# Patient Record
Sex: FEMALE | Race: WHITE | NOT HISPANIC OR LATINO | URBAN - METROPOLITAN AREA
[De-identification: names, ages, dates, MRNs, and addresses within clinical notes are randomized per-mention and may not be internally consistent; named-entity substitution may affect disease eponyms.]

---

## 2022-10-11 ENCOUNTER — OFFICE VISIT (OUTPATIENT)
Dept: NEUROLOGY | Facility: CLINIC | Age: 27
End: 2022-10-11
Payer: COMMERCIAL

## 2022-10-11 DIAGNOSIS — G43.709 CHRONIC MIGRAINE WITHOUT AURA WITHOUT STATUS MIGRAINOSUS, NOT INTRACTABLE: Primary | ICD-10-CM

## 2022-10-11 PROCEDURE — 99202 PR OFFICE/OUTPT VISIT, NEW, LEVL II, 15-29 MIN: ICD-10-PCS | Mod: 95,,,

## 2022-10-11 PROCEDURE — 99202 OFFICE O/P NEW SF 15 MIN: CPT | Mod: 95,,,

## 2022-10-11 RX ORDER — FREMANEZUMAB-VFRM 225 MG/1.5ML
225 INJECTION SUBCUTANEOUS
Qty: 3 EACH | Refills: 3 | Status: SHIPPED | OUTPATIENT
Start: 2022-10-11 | End: 2022-12-13 | Stop reason: ALTCHOICE

## 2022-10-11 RX ORDER — SUMATRIPTAN SUCCINATE 100 MG/1
100 TABLET ORAL 2 TIMES DAILY PRN
Qty: 12 TABLET | Refills: 11 | Status: SHIPPED | OUTPATIENT
Start: 2022-10-11 | End: 2023-10-13 | Stop reason: SDUPTHER

## 2022-11-07 ENCOUNTER — OFFICE VISIT (OUTPATIENT)
Dept: OBSTETRICS AND GYNECOLOGY | Facility: CLINIC | Age: 27
End: 2022-11-07
Attending: OBSTETRICS & GYNECOLOGY
Payer: COMMERCIAL

## 2022-11-07 VITALS
DIASTOLIC BLOOD PRESSURE: 84 MMHG | BODY MASS INDEX: 29.59 KG/M2 | WEIGHT: 156.75 LBS | SYSTOLIC BLOOD PRESSURE: 116 MMHG | HEIGHT: 61 IN

## 2022-11-07 DIAGNOSIS — Z01.419 WELL WOMAN EXAM: Primary | ICD-10-CM

## 2022-11-07 DIAGNOSIS — Z83.49 FAMILY HISTORY OF THYROID DISEASE: ICD-10-CM

## 2022-11-07 PROCEDURE — 1159F PR MEDICATION LIST DOCUMENTED IN MEDICAL RECORD: ICD-10-PCS | Mod: CPTII,S$GLB,, | Performed by: OBSTETRICS & GYNECOLOGY

## 2022-11-07 PROCEDURE — 1160F PR REVIEW ALL MEDS BY PRESCRIBER/CLIN PHARMACIST DOCUMENTED: ICD-10-PCS | Mod: CPTII,S$GLB,, | Performed by: OBSTETRICS & GYNECOLOGY

## 2022-11-07 PROCEDURE — 99385 PREV VISIT NEW AGE 18-39: CPT | Mod: S$GLB,,, | Performed by: OBSTETRICS & GYNECOLOGY

## 2022-11-07 PROCEDURE — 3074F SYST BP LT 130 MM HG: CPT | Mod: CPTII,S$GLB,, | Performed by: OBSTETRICS & GYNECOLOGY

## 2022-11-07 PROCEDURE — 3079F PR MOST RECENT DIASTOLIC BLOOD PRESSURE 80-89 MM HG: ICD-10-PCS | Mod: CPTII,S$GLB,, | Performed by: OBSTETRICS & GYNECOLOGY

## 2022-11-07 PROCEDURE — 1160F RVW MEDS BY RX/DR IN RCRD: CPT | Mod: CPTII,S$GLB,, | Performed by: OBSTETRICS & GYNECOLOGY

## 2022-11-07 PROCEDURE — 3074F PR MOST RECENT SYSTOLIC BLOOD PRESSURE < 130 MM HG: ICD-10-PCS | Mod: CPTII,S$GLB,, | Performed by: OBSTETRICS & GYNECOLOGY

## 2022-11-07 PROCEDURE — 99999 PR PBB SHADOW E&M-EST. PATIENT-LVL III: CPT | Mod: PBBFAC,,, | Performed by: OBSTETRICS & GYNECOLOGY

## 2022-11-07 PROCEDURE — 3008F BODY MASS INDEX DOCD: CPT | Mod: CPTII,S$GLB,, | Performed by: OBSTETRICS & GYNECOLOGY

## 2022-11-07 PROCEDURE — 88175 CYTOPATH C/V AUTO FLUID REDO: CPT | Performed by: OBSTETRICS & GYNECOLOGY

## 2022-11-07 PROCEDURE — 99999 PR PBB SHADOW E&M-EST. PATIENT-LVL III: ICD-10-PCS | Mod: PBBFAC,,, | Performed by: OBSTETRICS & GYNECOLOGY

## 2022-11-07 PROCEDURE — 99385 PR PREVENTIVE VISIT,NEW,18-39: ICD-10-PCS | Mod: S$GLB,,, | Performed by: OBSTETRICS & GYNECOLOGY

## 2022-11-07 PROCEDURE — 1159F MED LIST DOCD IN RCRD: CPT | Mod: CPTII,S$GLB,, | Performed by: OBSTETRICS & GYNECOLOGY

## 2022-11-07 PROCEDURE — 3079F DIAST BP 80-89 MM HG: CPT | Mod: CPTII,S$GLB,, | Performed by: OBSTETRICS & GYNECOLOGY

## 2022-11-07 PROCEDURE — 3008F PR BODY MASS INDEX (BMI) DOCUMENTED: ICD-10-PCS | Mod: CPTII,S$GLB,, | Performed by: OBSTETRICS & GYNECOLOGY

## 2022-11-07 RX ORDER — CETIRIZINE HYDROCHLORIDE 10 MG/1
CAPSULE, LIQUID FILLED ORAL
COMMUNITY

## 2022-11-07 NOTE — PROGRESS NOTES
"CC: Well woman exam    Poornima Anaya is a 27 y.o. female  presents for well woman exam.  LMP: No LMP recorded (lmp unknown). Patient has had an implant..  No gyn issues, problems, or complaints.      2019 Liletta.  Amenorrheic and happy with it    2019 last pap  No hx abnormal pap  Has not had gardasil    . From [facility] PCP.  Requests baseline annual labs    Past Medical History:   Diagnosis Date    Migraines      Past Surgical History:   Procedure Laterality Date    TONSILLECTOMY       Social History     Socioeconomic History    Marital status:    Tobacco Use    Smoking status: Never    Smokeless tobacco: Never   Substance and Sexual Activity    Alcohol use: Yes    Drug use: Never    Sexual activity: Yes     Partners: Male     Birth control/protection: I.U.D.     Family History   Problem Relation Age of Onset    Breast cancer Maternal Grandmother     Thyroid cancer Maternal Grandmother      OB History          0    Para   0    Term   0       0    AB   0    Living   0         SAB   0    IAB   0    Ectopic   0    Multiple   0    Live Births   0                 /84   Ht 5' 1" (1.549 m)   Wt 71.1 kg (156 lb 12 oz)   LMP  (LMP Unknown) Comment: IUD  BMI 29.62 kg/m²       ROS:  GENERAL: Denies weight gain or weight loss. Feeling well overall.   SKIN: Denies rash or lesions.   HEAD: Denies head injury or headache.   NODES: Denies enlarged lymph nodes.   CHEST: Denies chest pain or shortness of breath.   CARDIOVASCULAR: Denies palpitations or left sided chest pain.   ABDOMEN: No abdominal pain, constipation, diarrhea, nausea, vomiting or rectal bleeding.   URINARY: No frequency, dysuria, hematuria, or burning on urination.  REPRODUCTIVE: See HPI.   BREASTS: The patient performs breast self-examination and denies pain, lumps, or nipple discharge.   HEMATOLOGIC: No easy bruisability or excessive bleeding.   MUSCULOSKELETAL: Denies joint pain or swelling.   NEUROLOGIC: " Denies syncope or weakness.   PSYCHIATRIC: Denies depression, anxiety or mood swings.    PHYSICAL EXAM:  APPEARANCE: Well nourished, well developed, in no acute distress.  AFFECT: WNL, alert and oriented x 3  SKIN: No acne or hirsutism  NECK: Neck symmetric without masses or thyromegaly  NODES: No inguinal, cervical, axillary, or femoral lymph node enlargement  CHEST: Good respiratory effect  ABDOMEN: Soft.  No tenderness or masses.  No hepatosplenomegaly.  No hernias.  BREASTS: Symmetrical, no skin changes or visible lesions.  No palpable masses, nipple discharge bilaterally.  PELVIC: Normal external genitalia without lesions.  Normal hair distribution.  Adequate perineal body, normal urethral meatus.  Vagina moist and well rugated without lesions or discharge.  Cervix pink, without lesions, discharge or tenderness.  No significant cystocele or rectocele.  Bimanual exam shows uterus to be normal size, regular, mobile and nontender.  Adnexa without masses or tenderness.    EXTREMITIES: No edema.      ASSESSMENT & PLAN    ICD-10-CM ICD-9-CM    1. Well woman exam  Z01.419 V72.31 Liquid-Based Pap Smear, Screening      CBC Auto Differential      COMPREHENSIVE METABOLIC PANEL      Lipid Panel      TSH      2. Family history of thyroid disease  Z83.49 V18.19 TSH             Patient was counseled today on A.C.S. Pap guidelines and recommendations for yearly pelvic exams, mammograms and monthly self breast exams; to see her PCP for other health maintenance.

## 2022-11-09 ENCOUNTER — LAB VISIT (OUTPATIENT)
Dept: LAB | Facility: OTHER | Age: 27
End: 2022-11-09
Attending: OBSTETRICS & GYNECOLOGY
Payer: COMMERCIAL

## 2022-11-09 DIAGNOSIS — Z01.419 WELL WOMAN EXAM: ICD-10-CM

## 2022-11-09 DIAGNOSIS — Z83.49 FAMILY HISTORY OF THYROID DISEASE: ICD-10-CM

## 2022-11-09 LAB
ALBUMIN SERPL BCP-MCNC: 4.4 G/DL (ref 3.5–5.2)
ALP SERPL-CCNC: 44 U/L (ref 55–135)
ALT SERPL W/O P-5'-P-CCNC: 12 U/L (ref 10–44)
ANION GAP SERPL CALC-SCNC: 8 MMOL/L (ref 8–16)
AST SERPL-CCNC: 18 U/L (ref 10–40)
BASOPHILS # BLD AUTO: 0.04 K/UL (ref 0–0.2)
BASOPHILS NFR BLD: 0.7 % (ref 0–1.9)
BILIRUB SERPL-MCNC: 0.7 MG/DL (ref 0.1–1)
BUN SERPL-MCNC: 13 MG/DL (ref 6–20)
CALCIUM SERPL-MCNC: 9.6 MG/DL (ref 8.7–10.5)
CHLORIDE SERPL-SCNC: 107 MMOL/L (ref 95–110)
CO2 SERPL-SCNC: 25 MMOL/L (ref 23–29)
CREAT SERPL-MCNC: 0.9 MG/DL (ref 0.5–1.4)
DIFFERENTIAL METHOD: NORMAL
EOSINOPHIL # BLD AUTO: 0.1 K/UL (ref 0–0.5)
EOSINOPHIL NFR BLD: 2 % (ref 0–8)
ERYTHROCYTE [DISTWIDTH] IN BLOOD BY AUTOMATED COUNT: 12.5 % (ref 11.5–14.5)
EST. GFR  (NO RACE VARIABLE): >60 ML/MIN/1.73 M^2
GLUCOSE SERPL-MCNC: 90 MG/DL (ref 70–110)
HCT VFR BLD AUTO: 42.7 % (ref 37–48.5)
HGB BLD-MCNC: 14.1 G/DL (ref 12–16)
IMM GRANULOCYTES # BLD AUTO: 0.02 K/UL (ref 0–0.04)
IMM GRANULOCYTES NFR BLD AUTO: 0.3 % (ref 0–0.5)
LYMPHOCYTES # BLD AUTO: 2.2 K/UL (ref 1–4.8)
LYMPHOCYTES NFR BLD: 35.5 % (ref 18–48)
MCH RBC QN AUTO: 30.8 PG (ref 27–31)
MCHC RBC AUTO-ENTMCNC: 33 G/DL (ref 32–36)
MCV RBC AUTO: 93 FL (ref 82–98)
MONOCYTES # BLD AUTO: 0.4 K/UL (ref 0.3–1)
MONOCYTES NFR BLD: 6.6 % (ref 4–15)
NEUTROPHILS # BLD AUTO: 3.3 K/UL (ref 1.8–7.7)
NEUTROPHILS NFR BLD: 54.9 % (ref 38–73)
NRBC BLD-RTO: 0 /100 WBC
PLATELET # BLD AUTO: 236 K/UL (ref 150–450)
PMV BLD AUTO: 9.7 FL (ref 9.2–12.9)
POTASSIUM SERPL-SCNC: 4.5 MMOL/L (ref 3.5–5.1)
PROT SERPL-MCNC: 7.1 G/DL (ref 6–8.4)
RBC # BLD AUTO: 4.58 M/UL (ref 4–5.4)
SODIUM SERPL-SCNC: 140 MMOL/L (ref 136–145)
TSH SERPL DL<=0.005 MIU/L-ACNC: 1.1 UIU/ML (ref 0.4–4)
WBC # BLD AUTO: 6.05 K/UL (ref 3.9–12.7)

## 2022-11-09 PROCEDURE — 80053 COMPREHEN METABOLIC PANEL: CPT | Performed by: OBSTETRICS & GYNECOLOGY

## 2022-11-09 PROCEDURE — 85025 COMPLETE CBC W/AUTO DIFF WBC: CPT | Performed by: OBSTETRICS & GYNECOLOGY

## 2022-11-09 PROCEDURE — 36415 COLL VENOUS BLD VENIPUNCTURE: CPT | Performed by: OBSTETRICS & GYNECOLOGY

## 2022-11-09 PROCEDURE — 84443 ASSAY THYROID STIM HORMONE: CPT | Performed by: OBSTETRICS & GYNECOLOGY

## 2022-12-09 NOTE — PROGRESS NOTES
Subjective:       Patient ID: Poornima Anaya is a 27 y.o. female.      History of Present Illness  The patient location is: [place]  The chief complaint leading to consultation is: Migraines    Visit type: audiovisual    Face to Face time with patient: 15  20 minutes of total time spent on the encounter, which includes face to face time and non-face to face time preparing to see the patient (eg, review of tests), Obtaining and/or reviewing separately obtained history, Documenting clinical information in the electronic or other health record, Independently interpreting results (not separately reported) and communicating results to the patient/family/caregiver, or Care coordination (not separately reported).         Each patient to whom he or she provides medical services by telemedicine is:  (1) informed of the relationship between the physician and patient and the respective role of any other health care provider with respect to management of the patient; and (2) notified that he or she may decline to receive medical services by telemedicine and may withdraw from such care at any time.    Notes:    27 year old female who presents today for evaluation of migraines. Past medical history below. She states she has a history of migraines that started when she was in high school. Was following a neurologist in [place] before relocating to the [facility]. She is currently on Ajovy as a preventative and sumatriptan as an abortive medication. Before starting this regimen she states she had daily headaches and multiple migraines per month. Now she is down to 1 headache day per week and wishes to stick with her current medications. She describes her headaches as a sharp severe left sided throbbing pain that is associated with nausea, sensitivity to light, and sound.     Reports trying: Topamax, propranolol, and SSNRIs which caused multiple side effects     Plan:  1. Chronic migraine without aura without status  migrainosus, not intractable   fremanezumab-vfrm (AJOVY SYRINGE) 225 mg/1.5 mL injection; Inject 1.5 mLs (225 mg total) into the skin every 28 days.  Dispense: 3 each; Refill: 3   sumatriptan (IMITREX) 100 MG tablet; Take 1 tablet (100 mg total) by mouth 2 (two) times daily as needed for Migraine.  Dispense: 12 tablet; Refill: 11

## 2022-12-10 ENCOUNTER — PATIENT MESSAGE (OUTPATIENT)
Dept: NEUROLOGY | Facility: CLINIC | Age: 27
End: 2022-12-10
Payer: COMMERCIAL

## 2022-12-13 DIAGNOSIS — G43.709 CHRONIC MIGRAINE WITHOUT AURA WITHOUT STATUS MIGRAINOSUS, NOT INTRACTABLE: Primary | ICD-10-CM

## 2022-12-13 RX ORDER — ERENUMAB-AOOE 70 MG/ML
70 INJECTION SUBCUTANEOUS
Qty: 1 EACH | Refills: 11 | Status: SHIPPED | OUTPATIENT
Start: 2022-12-13 | End: 2023-11-17 | Stop reason: SDUPTHER

## 2022-12-28 PROBLEM — G43.009 MIGRAINE WITHOUT AURA AND WITHOUT STATUS MIGRAINOSUS, NOT INTRACTABLE: Status: ACTIVE | Noted: 2022-12-28

## 2022-12-28 NOTE — PROGRESS NOTES
FAMILY MEDICINE  [facility]    Reason for visit:   Chief Complaint   Patient presents with    Establish Care    Fatigue    Generalized Body Aches         SUBJECTIVE: Poornima Anaya is a 27 y.o. female  - with chronic migraines presents as a new patient today to establish care and discuss concerns for body pain, fatigue and sleepiness. Last PCP [facility]    Gynecology: Dr. Yulisa Richter MD  Neurology:Marily Zhang, KAREN    Poornima Anaya reports reports that she has been suffering last several years of joint pains, muscle aches, chronic allergies and excessive daytime sleepiness    1. Muscle aches, fatigue and joint pains     She reports that has been going on for several years.  Initially it started with her wrist.  She reports that she is typing a lot work in her wrist were constantly sore.  Recently that has improved since she has not been doing desk work frequently.  Then she reports she also has ankle pain as well as with pain and chronic neck pain without any radiation or decreased strength or numbness and tingling of her arms she was evaluated by her prior PCP in she reports that initially her sed rate was elevated however repeat testing was normal.  She also reports that her MICHAEL and rheumatoid testing at that time were normal.  She reports that she has had an elevated cortisol level would like that repeated as well.    She has found that she is more fatigued than she would expect for her age.  She tries to exercise regularly.  She reports that her joint pains worsen when she was doing more strenuous workout with high intensity interval training.  She currently is doing Pilates reports that she feels much better doing this type of exercise.  She does feel chronically tired throughout the day.  She has also gained 20 lb in the last 2 years    2. Excessive daytime sleepiness   She reports that this is also been a chronic issue.  She make sure that she gets 8 hours of sleep every night.   If she gets any less then she will be very tired.  However even with 8 hours of sleep she reports that after lunch she often has to take a nap.  She is currently back in the office working.  She works a half day and returns home after lunch.  She reports often she will have to take a nap.  She has not had a sleep evaluation.    She does suffer from chronic allergies.      3. Chronic allergies   She reports that while living in [facility] she was evaluated by an allergist.  She was started on allergy injections which seemed to be helpful but had difficulty with her insurance prior to moving to [place].  She reports that she take Zyrtec 10 mg nightly over-the-counter as well as occasional Benadryl.  She does not feel that montelukast has been effective for her in the past.  She also takes Sudafed about 4-5 days per week.  Her last dose of Sudafed was yesterday morning she takes this 12 hour Sudafed.  She has also done this for at least the last year.        Review of Systems   Constitutional:  Positive for malaise/fatigue.        Weight gain   Musculoskeletal:  Positive for myalgias and neck pain.   Neurological:  Positive for headaches.   Endo/Heme/Allergies:  Positive for environmental allergies.   All other systems reviewed and are negative.    HEALTH MAINTENANCE:   Health Maintenance   Topic Date Due    Hepatitis C Screening  Never done    Pap Smear  11/07/2025    TETANUS VACCINE  12/29/2032    Lipid Panel  Completed     Health Maintenance Topics with due status: Not Due       Topic Last Completion Date    Pap Smear 11/07/2022    TETANUS VACCINE 12/29/2022     Health Maintenance Due   Topic Date Due    Hepatitis C Screening  Never done    HIV Screening  Never done    COVID-19 Vaccine (2 - Pfizer series) 10/14/2022       HISTORY:   Past Medical History:   Diagnosis Date    Allergy     Migraines        Past Surgical History:   Procedure Laterality Date    TONSILLECTOMY Bilateral        Family History  "  Problem Relation Age of Onset    Heart disease Mother     Migraines Mother     Cancer Father 60        Bladder cancer    Migraines Sister     Migraines Brother     Breast cancer Maternal Grandmother     Thyroid cancer Maternal Grandmother     Stroke Maternal Grandmother     Atrial fibrillation Maternal Grandmother     Lung cancer Maternal Grandfather     Stroke Paternal Grandmother     No Known Problems Paternal Grandfather        Social History     Tobacco Use    Smoking status: Never     Passive exposure: Never    Smokeless tobacco: Never   Substance Use Topics    Alcohol use: Yes     Alcohol/week: 2.0 standard drinks     Types: 2 Glasses of wine per week     Comment: weekend occasional    Drug use: Never       Social History     Social History Narrative    . No children. 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 Grew up in the [facility]. Parents now live in [place], [place]. She moved back to [facility] 6/2022       ALLERGIES:   Review of patient's allergies indicates:   Allergen Reactions    Penicillins Hives       MEDS:     Current Outpatient Medications:     cetirizine (ZYRTEC) 10 mg Cap, , Disp: , Rfl:     erenumab-aooe (AIMOVIG AUTOINJECTOR) 70 mg/mL autoinjector, Inject 1 mL (70 mg total) into the skin every 28 days., Disp: 1 each, Rfl: 11    fluticasone propionate (FLONASE ALLERGY RELIEF NASL), , Disp: , Rfl:     pseudoephedrine (SUDAFED) 120 mg 12 hr tablet, Take 120 mg by mouth every 12 (twelve) hours as needed., Disp: , Rfl:     sumatriptan (IMITREX) 100 MG tablet, Take 1 tablet (100 mg total) by mouth 2 (two) times daily as needed for Migraine., Disp: 12 tablet, Rfl: 11      Vital signs:   Vitals:    12/29/22 1510   Pulse: (!) 118   SpO2: 100%   Weight: 71.1 kg (156 lb 12 oz)   Height: 5' 1" (1.549 m)     Body mass index is 29.62 kg/m².  PHYSICAL EXAM:     Physical Exam  Vitals reviewed.   Constitutional:       General: She is not in acute distress.  HENT:      Head: Normocephalic and atraumatic. "   Eyes:      General: No scleral icterus.     Conjunctiva/sclera: Conjunctivae normal.      Pupils: Pupils are equal, round, and reactive to light.   Neck:      Thyroid: No thyroid mass, thyromegaly or thyroid tenderness.      Trachea: Trachea normal.   Cardiovascular:      Rate and Rhythm: Regular rhythm. Tachycardia present.      Pulses: Normal pulses.      Heart sounds: Normal heart sounds. No murmur heard.    No friction rub. No gallop.   Pulmonary:      Effort: Pulmonary effort is normal.      Breath sounds: Normal breath sounds. No wheezing or rales.   Abdominal:      General: Bowel sounds are normal. There is no distension.      Palpations: Abdomen is soft.      Tenderness: There is no abdominal tenderness.   Musculoskeletal:      Cervical back: Normal range of motion and neck supple. No swelling, rigidity or crepitus. Muscular tenderness (Left paraspinal) present. No spinous process tenderness. Normal range of motion.      Thoracic back: Normal.      Lumbar back: Normal. Negative right straight leg raise test and negative left straight leg raise test.      Right lower leg: No edema.      Left lower leg: No edema.      Comments: Cervical spine: Bilateral Spurling test negative   Lymphadenopathy:      Cervical: No cervical adenopathy.   Skin:     General: Skin is warm.      Capillary Refill: Capillary refill takes less than 2 seconds.             Comments: Red: + tenderpoints  4/18 fibromyalgia tender points   Neurological:      Mental Status: She is alert.      Sensory: Sensation is intact.      Motor: Motor function is intact.      Deep Tendon Reflexes: Reflexes are normal and symmetric.         PHQ4 = No data recorded    PERTINENT RESULTS:   No visits with results within 1 Week(s) from this visit.   Latest known visit with results is:   Lab Visit on 11/09/2022   Component Date Value Ref Range Status    Cholesterol 11/09/2022 159  120 - 199 mg/dL Final    Comment: The National Cholesterol Education Program  (NCEP) has set the  following guidelines (reference ranges) for Cholesterol:  Optimal.....................<200 mg/dL  Borderline High.............200-239 mg/dL  High........................> or = 240 mg/dL      Triglycerides 11/09/2022 38  30 - 150 mg/dL Final    Comment: The National Cholesterol Education Program (NCEP) has set the  following guidelines (reference values) for triglycerides:  Normal......................<150 mg/dL  Borderline High.............150-199 mg/dL  High........................200-499 mg/dL      HDL 11/09/2022 62  40 - 75 mg/dL Final    Comment: The National Cholesterol Education Program (NCEP) has set the  following guidelines (reference values) for HDL Cholesterol:  Low...............<40 mg/dL  Optimal...........>60 mg/dL      LDL Cholesterol 11/09/2022 89.4  63.0 - 159.0 mg/dL Final    Comment: The National Cholesterol Education Program (NCEP) has set the  following guidelines (reference values) for LDL Cholesterol:  Optimal.......................<130 mg/dL  Borderline High...............130-159 mg/dL  High..........................160-189 mg/dL  Very High.....................>190 mg/dL      HDL/Cholesterol Ratio 11/09/2022 39.0  20.0 - 50.0 % Final    Total Cholesterol/HDL Ratio 11/09/2022 2.6  2.0 - 5.0 Final    Non-HDL Cholesterol 11/09/2022 97  mg/dL Final    Comment: Risk category and Non-HDL cholesterol goals:  Coronary heart disease (CHD)or equivalent (10-year risk of CHD >20%):  Non-HDL cholesterol goal     <130 mg/dL  Two or more CHD risk factors and 10-year risk of CHD <= 20%:  Non-HDL cholesterol goal     <160 mg/dL  0 to 1 CHD risk factor:  Non-HDL cholesterol goal     <190 mg/dL       Lab Results   Component Value Date    WBC 6.05 11/09/2022    HGB 14.1 11/09/2022    HCT 42.7 11/09/2022    MCV 93 11/09/2022     11/09/2022       Lab Results   Component Value Date    TSH 1.101 11/09/2022     CMP  Sodium   Date Value Ref Range Status   11/09/2022 140 136 - 145 mmol/L  Final     Potassium   Date Value Ref Range Status   11/09/2022 4.5 3.5 - 5.1 mmol/L Final     Chloride   Date Value Ref Range Status   11/09/2022 107 95 - 110 mmol/L Final     CO2   Date Value Ref Range Status   11/09/2022 25 23 - 29 mmol/L Final     Glucose   Date Value Ref Range Status   11/09/2022 90 70 - 110 mg/dL Final     BUN   Date Value Ref Range Status   11/09/2022 13 6 - 20 mg/dL Final     Creatinine   Date Value Ref Range Status   11/09/2022 0.9 0.5 - 1.4 mg/dL Final     Calcium   Date Value Ref Range Status   11/09/2022 9.6 8.7 - 10.5 mg/dL Final     Total Protein   Date Value Ref Range Status   11/09/2022 7.1 6.0 - 8.4 g/dL Final     Albumin   Date Value Ref Range Status   11/09/2022 4.4 3.5 - 5.2 g/dL Final     Total Bilirubin   Date Value Ref Range Status   11/09/2022 0.7 0.1 - 1.0 mg/dL Final     Comment:     For infants and newborns, interpretation of results should be based  on gestational age, weight and in agreement with clinical  observations.    Premature Infant recommended reference ranges:  Up to 24 hours.............<8.0 mg/dL  Up to 48 hours............<12.0 mg/dL  3-5 days..................<15.0 mg/dL  6-29 days.................<15.0 mg/dL       Alkaline Phosphatase   Date Value Ref Range Status   11/09/2022 44 (L) 55 - 135 U/L Final     AST   Date Value Ref Range Status   11/09/2022 18 10 - 40 U/L Final     ALT   Date Value Ref Range Status   11/09/2022 12 10 - 44 U/L Final     Anion Gap   Date Value Ref Range Status   11/09/2022 8 8 - 16 mmol/L Final     eGFR   Date Value Ref Range Status   11/09/2022 >60 >60 mL/min/1.73 m^2 Final         ASSESSMENT/PLAN:  1. Fatigue, unspecified type  Overview:  - could be related to myofascial pain syndrome verses medication overuse verses poor sleep verses narcolepsy  -recommend focus on lifestyle with diet and exercise   -reviewed recent labs recommend follow-up labs      2. Myalgia  Overview:  - recommend lab evaluation for rule out out of  rheumatological illnesses  -discussed that I am concerned that she may have a myofascial pain syndrome but she does not meet criteria for fibromyalgia at this time   -discussed management with lifestyle  -recommend anti-inflammatory diet   -recommend continue Pilates and discussed at adding yoga, as well as aqua therapy      3. Polyarthralgia  Overview:  - no joint deformities   -recommend check MICHAEL, rheumatoid factor, CRP and ESR   -discuss if any abnormalities recommend evaluation by rheumatology    Orders:  -     MICHAEL Screen w/Reflex; Future; Expected date: 12/29/2022  -     Rheumatoid Factor; Future; Expected date: 12/29/2022  -     Sedimentation rate; Future; Expected date: 12/29/2022  -     C-REACTIVE PROTEIN; Future; Expected date: 12/29/2022    4. Tachycardia  Overview:  - sinus tachycardia   -discussed these may be related with chronic Sudafed use   -recommend decrease Sudafed use possible  -reviewed last TSH and CBC which were normal  - check cortisol level      5. Weight gain  Overview:  - TSH normal   -check labs   -recommend anti-inflammatory diet  - may also be affected by excessive daytime sleepiness      6. Excessive daytime sleepiness  Overview:  - recommend sleep evaluation    Orders:  -     Ambulatory referral/consult to Sleep Disorders; Future; Expected date: 01/05/2023    7. Chronic rhinitis  Overview:  - concerned that her allergic rhinitis is affecting her sleep and chronic fatigue   -recommend allergy evaluation    Orders:  -     Ambulatory referral/consult to Allergy; Future; Expected date: 01/05/2023    8. Elevated cortisol level  Overview:  - she reports a history of a high cortisol level recommend repeat 8 a.m. cortisol    Orders:  -     CORTISOL, 8AM; Future; Expected date: 12/29/2022    9. Chronic neck pain  Overview:  - no signs of neurological claudication   -recommend physical therapy    Orders:  -     Ambulatory referral/consult to Physical/Occupational Therapy; Future; Expected  date: 01/05/2023    10. Need for diphtheria-tetanus-pertussis (Tdap) vaccine  -     (In Office Administered) Tdap Vaccine    11. Needs flu shot  -     Influenza - Quadrivalent *Preferred* (6 months+) (PF)    12. Screening for HIV (human immunodeficiency virus)  -     HIV 1/2 Ag/Ab (4th Gen); Future; Expected date: 12/29/2022    13. Need for hepatitis C screening test  -     Hepatitis C Antibody; Future; Expected date: 12/29/2022          ORDERS:   Orders Placed This Encounter    (In Office Administered) Tdap Vaccine    Influenza - Quadrivalent *Preferred* (6 months+) (PF)    MICHAEL Screen w/Reflex    Rheumatoid Factor    Sedimentation rate    C-REACTIVE PROTEIN    CORTISOL, 8AM    HIV 1/2 Ag/Ab (4th Gen)    Hepatitis C Antibody    Ambulatory referral/consult to Sleep Disorders    Ambulatory referral/consult to Allergy    Ambulatory referral/consult to Physical/Occupational Therapy       Vaccines recommended:  COVID-19 booster, Tdap and flu    Follow-up in 1 month or sooner if any concerns.      This note is dictated using the M*Modal Fluency Direct word recognition program. There are word recognition mistakes that are occasionally missed on review.    Dr. Domitila Ceballos D.O.   [facility]

## 2022-12-29 ENCOUNTER — PATIENT MESSAGE (OUTPATIENT)
Dept: PRIMARY CARE CLINIC | Facility: CLINIC | Age: 27
End: 2022-12-29

## 2022-12-29 ENCOUNTER — OFFICE VISIT (OUTPATIENT)
Dept: PRIMARY CARE CLINIC | Facility: CLINIC | Age: 27
End: 2022-12-29
Attending: FAMILY MEDICINE
Payer: COMMERCIAL

## 2022-12-29 VITALS — WEIGHT: 156.75 LBS | HEART RATE: 118 BPM | HEIGHT: 61 IN | OXYGEN SATURATION: 100 % | BODY MASS INDEX: 29.59 KG/M2

## 2022-12-29 DIAGNOSIS — M54.2 CHRONIC NECK PAIN: ICD-10-CM

## 2022-12-29 DIAGNOSIS — R63.5 WEIGHT GAIN: ICD-10-CM

## 2022-12-29 DIAGNOSIS — G89.29 CHRONIC NECK PAIN: ICD-10-CM

## 2022-12-29 DIAGNOSIS — R53.83 FATIGUE, UNSPECIFIED TYPE: Primary | ICD-10-CM

## 2022-12-29 DIAGNOSIS — Z11.4 SCREENING FOR HIV (HUMAN IMMUNODEFICIENCY VIRUS): ICD-10-CM

## 2022-12-29 DIAGNOSIS — Z11.59 NEED FOR HEPATITIS C SCREENING TEST: ICD-10-CM

## 2022-12-29 DIAGNOSIS — Z23 NEED FOR DIPHTHERIA-TETANUS-PERTUSSIS (TDAP) VACCINE: ICD-10-CM

## 2022-12-29 DIAGNOSIS — M25.50 POLYARTHRALGIA: ICD-10-CM

## 2022-12-29 DIAGNOSIS — J31.0 CHRONIC RHINITIS: ICD-10-CM

## 2022-12-29 DIAGNOSIS — Z23 NEEDS FLU SHOT: ICD-10-CM

## 2022-12-29 DIAGNOSIS — R79.89 ELEVATED CORTISOL LEVEL: ICD-10-CM

## 2022-12-29 DIAGNOSIS — M79.10 MYALGIA: ICD-10-CM

## 2022-12-29 DIAGNOSIS — G47.19 EXCESSIVE DAYTIME SLEEPINESS: ICD-10-CM

## 2022-12-29 DIAGNOSIS — R00.0 TACHYCARDIA: ICD-10-CM

## 2022-12-29 PROCEDURE — 1159F PR MEDICATION LIST DOCUMENTED IN MEDICAL RECORD: ICD-10-PCS | Mod: CPTII,S$GLB,, | Performed by: FAMILY MEDICINE

## 2022-12-29 PROCEDURE — 90472 TDAP VACCINE GREATER THAN OR EQUAL TO 7YO IM: ICD-10-PCS | Mod: S$GLB,,, | Performed by: FAMILY MEDICINE

## 2022-12-29 PROCEDURE — 3008F BODY MASS INDEX DOCD: CPT | Mod: CPTII,S$GLB,, | Performed by: FAMILY MEDICINE

## 2022-12-29 PROCEDURE — 90715 TDAP VACCINE GREATER THAN OR EQUAL TO 7YO IM: ICD-10-PCS | Mod: S$GLB,,, | Performed by: FAMILY MEDICINE

## 2022-12-29 PROCEDURE — 90471 IMMUNIZATION ADMIN: CPT | Mod: S$GLB,,, | Performed by: FAMILY MEDICINE

## 2022-12-29 PROCEDURE — 90715 TDAP VACCINE 7 YRS/> IM: CPT | Mod: S$GLB,,, | Performed by: FAMILY MEDICINE

## 2022-12-29 PROCEDURE — 99999 PR PBB SHADOW E&M-EST. PATIENT-LVL V: ICD-10-PCS | Mod: PBBFAC,,, | Performed by: FAMILY MEDICINE

## 2022-12-29 PROCEDURE — 1160F PR REVIEW ALL MEDS BY PRESCRIBER/CLIN PHARMACIST DOCUMENTED: ICD-10-PCS | Mod: CPTII,S$GLB,, | Performed by: FAMILY MEDICINE

## 2022-12-29 PROCEDURE — 1159F MED LIST DOCD IN RCRD: CPT | Mod: CPTII,S$GLB,, | Performed by: FAMILY MEDICINE

## 2022-12-29 PROCEDURE — 90472 IMMUNIZATION ADMIN EACH ADD: CPT | Mod: S$GLB,,, | Performed by: FAMILY MEDICINE

## 2022-12-29 PROCEDURE — 3008F PR BODY MASS INDEX (BMI) DOCUMENTED: ICD-10-PCS | Mod: CPTII,S$GLB,, | Performed by: FAMILY MEDICINE

## 2022-12-29 PROCEDURE — 99204 PR OFFICE/OUTPT VISIT, NEW, LEVL IV, 45-59 MIN: ICD-10-PCS | Mod: 25,S$GLB,, | Performed by: FAMILY MEDICINE

## 2022-12-29 PROCEDURE — 90686 IIV4 VACC NO PRSV 0.5 ML IM: CPT | Mod: S$GLB,,, | Performed by: FAMILY MEDICINE

## 2022-12-29 PROCEDURE — 90471 FLU VACCINE (QUAD) GREATER THAN OR EQUAL TO 3YO PRESERVATIVE FREE IM: ICD-10-PCS | Mod: S$GLB,,, | Performed by: FAMILY MEDICINE

## 2022-12-29 PROCEDURE — 90686 FLU VACCINE (QUAD) GREATER THAN OR EQUAL TO 3YO PRESERVATIVE FREE IM: ICD-10-PCS | Mod: S$GLB,,, | Performed by: FAMILY MEDICINE

## 2022-12-29 PROCEDURE — 99999 PR PBB SHADOW E&M-EST. PATIENT-LVL V: CPT | Mod: PBBFAC,,, | Performed by: FAMILY MEDICINE

## 2022-12-29 PROCEDURE — 99204 OFFICE O/P NEW MOD 45 MIN: CPT | Mod: 25,S$GLB,, | Performed by: FAMILY MEDICINE

## 2022-12-29 PROCEDURE — 1160F RVW MEDS BY RX/DR IN RCRD: CPT | Mod: CPTII,S$GLB,, | Performed by: FAMILY MEDICINE

## 2022-12-29 RX ORDER — PSEUDOEPHEDRINE HCL 120 MG/1
120 TABLET, FILM COATED, EXTENDED RELEASE ORAL EVERY 12 HOURS PRN
COMMUNITY
End: 2024-03-12

## 2022-12-29 NOTE — PROGRESS NOTES
After obtaining verbal consent from the patient, and per orders of Dr. Ceballos, injection of fluarix Lot [id number] 6/30/23 given in the RD by KILLAIN GONZALES. Patient tolerated well and band aid applied. Patient instructed to remain in clinic for 15 minutes afterwards, and to report any adverse reaction to me immediately.    After obtaining verbal consent from the patient, and per orders of Dr. Ceballos, injection of tdap Lot [id number] 11/25/24 given in the LD by KILLIAN GONZALES. Patient tolerated well and band aid applied. Patient instructed to remain in clinic for 15 minutes afterwards, and to report any adverse reaction to me immediately.

## 2022-12-30 ENCOUNTER — LAB VISIT (OUTPATIENT)
Dept: LAB | Facility: HOSPITAL | Age: 27
End: 2022-12-30
Attending: FAMILY MEDICINE
Payer: COMMERCIAL

## 2022-12-30 DIAGNOSIS — R79.89 ELEVATED CORTISOL LEVEL: ICD-10-CM

## 2022-12-30 DIAGNOSIS — R53.83 FATIGUE, UNSPECIFIED TYPE: ICD-10-CM

## 2022-12-30 DIAGNOSIS — M79.10 MYALGIA: ICD-10-CM

## 2022-12-30 DIAGNOSIS — Z11.4 SCREENING FOR HIV (HUMAN IMMUNODEFICIENCY VIRUS): ICD-10-CM

## 2022-12-30 DIAGNOSIS — Z11.59 NEED FOR HEPATITIS C SCREENING TEST: ICD-10-CM

## 2022-12-30 DIAGNOSIS — M25.50 POLYARTHRALGIA: ICD-10-CM

## 2022-12-30 LAB — RHEUMATOID FACT SERPL-ACNC: <13 IU/ML (ref 0–15)

## 2022-12-30 PROCEDURE — 86039 ANTINUCLEAR ANTIBODIES (ANA): CPT | Performed by: FAMILY MEDICINE

## 2022-12-30 PROCEDURE — 86803 HEPATITIS C AB TEST: CPT | Performed by: FAMILY MEDICINE

## 2022-12-30 PROCEDURE — 36415 COLL VENOUS BLD VENIPUNCTURE: CPT | Mod: PN | Performed by: FAMILY MEDICINE

## 2022-12-30 PROCEDURE — 82533 TOTAL CORTISOL: CPT | Performed by: FAMILY MEDICINE

## 2022-12-30 PROCEDURE — 86140 C-REACTIVE PROTEIN: CPT | Performed by: FAMILY MEDICINE

## 2022-12-30 PROCEDURE — 85652 RBC SED RATE AUTOMATED: CPT | Performed by: FAMILY MEDICINE

## 2022-12-30 PROCEDURE — 86235 NUCLEAR ANTIGEN ANTIBODY: CPT | Mod: 59 | Performed by: FAMILY MEDICINE

## 2022-12-30 PROCEDURE — 86431 RHEUMATOID FACTOR QUANT: CPT | Performed by: FAMILY MEDICINE

## 2022-12-30 PROCEDURE — 86038 ANTINUCLEAR ANTIBODIES: CPT | Performed by: FAMILY MEDICINE

## 2022-12-30 PROCEDURE — 87389 HIV-1 AG W/HIV-1&-2 AB AG IA: CPT | Performed by: FAMILY MEDICINE

## 2022-12-30 PROCEDURE — 82607 VITAMIN B-12: CPT | Performed by: FAMILY MEDICINE

## 2022-12-31 LAB
CORTIS SERPL-MCNC: 14.7 UG/DL (ref 4.3–22.4)
CRP SERPL-MCNC: 0.9 MG/L (ref 0–8.2)
ERYTHROCYTE [SEDIMENTATION RATE] IN BLOOD BY PHOTOMETRIC METHOD: 3 MM/HR (ref 0–36)
HCV AB SERPL QL IA: NORMAL
HIV 1+2 AB+HIV1 P24 AG SERPL QL IA: NORMAL
VIT B12 SERPL-MCNC: 230 PG/ML (ref 210–950)

## 2023-01-03 ENCOUNTER — PATIENT MESSAGE (OUTPATIENT)
Dept: PRIMARY CARE CLINIC | Facility: CLINIC | Age: 28
End: 2023-01-03
Payer: COMMERCIAL

## 2023-01-03 LAB
ANA PATTERN 1: NORMAL
ANA PATTERN 2: NORMAL
ANA SER QL IF: POSITIVE
ANA TITER 2: NORMAL
ANA TITR SER IF: NORMAL {TITER}

## 2023-01-04 ENCOUNTER — PATIENT MESSAGE (OUTPATIENT)
Dept: PRIMARY CARE CLINIC | Facility: CLINIC | Age: 28
End: 2023-01-04
Payer: COMMERCIAL

## 2023-01-04 DIAGNOSIS — R76.8 POSITIVE ANA (ANTINUCLEAR ANTIBODY): Primary | ICD-10-CM

## 2023-01-04 DIAGNOSIS — M25.50 POLYARTHRALGIA: ICD-10-CM

## 2023-01-04 LAB
ANTI SM ANTIBODY: 0.06 RATIO (ref 0–0.99)
ANTI SM/RNP ANTIBODY: 0.11 RATIO (ref 0–0.99)
ANTI-SM INTERPRETATION: NEGATIVE
ANTI-SM/RNP INTERPRETATION: NEGATIVE
ANTI-SSA ANTIBODY: 0.06 RATIO (ref 0–0.99)
ANTI-SSA INTERPRETATION: NEGATIVE
ANTI-SSB ANTIBODY: 0.05 RATIO (ref 0–0.99)
ANTI-SSB INTERPRETATION: NEGATIVE
DSDNA AB SER-ACNC: NORMAL [IU]/ML

## 2023-01-04 NOTE — PROGRESS NOTES
Orders Placed This Encounter    Ambulatory referral/consult to Rheumatology     Dr. Domitila Ceballos D.O.   [facility]

## 2023-01-08 ENCOUNTER — OFFICE VISIT (OUTPATIENT)
Dept: URGENT CARE | Facility: CLINIC | Age: 28
End: 2023-01-08
Payer: COMMERCIAL

## 2023-01-08 VITALS
HEIGHT: 61 IN | HEART RATE: 84 BPM | RESPIRATION RATE: 18 BRPM | BODY MASS INDEX: 29.27 KG/M2 | DIASTOLIC BLOOD PRESSURE: 79 MMHG | SYSTOLIC BLOOD PRESSURE: 123 MMHG | WEIGHT: 155 LBS | TEMPERATURE: 98 F | OXYGEN SATURATION: 99 %

## 2023-01-08 DIAGNOSIS — J30.2 SEASONAL ALLERGIES: ICD-10-CM

## 2023-01-08 DIAGNOSIS — J06.9 VIRAL URI WITH COUGH: Primary | ICD-10-CM

## 2023-01-08 LAB
CTP QC/QA: YES
CTP QC/QA: YES
POC MOLECULAR INFLUENZA A AGN: NEGATIVE
POC MOLECULAR INFLUENZA B AGN: NEGATIVE
SARS-COV-2 AG RESP QL IA.RAPID: NEGATIVE

## 2023-01-08 PROCEDURE — 1159F MED LIST DOCD IN RCRD: CPT | Mod: CPTII,S$GLB,, | Performed by: FAMILY MEDICINE

## 2023-01-08 PROCEDURE — 1159F PR MEDICATION LIST DOCUMENTED IN MEDICAL RECORD: ICD-10-PCS | Mod: CPTII,S$GLB,, | Performed by: FAMILY MEDICINE

## 2023-01-08 PROCEDURE — 3074F PR MOST RECENT SYSTOLIC BLOOD PRESSURE < 130 MM HG: ICD-10-PCS | Mod: CPTII,S$GLB,, | Performed by: FAMILY MEDICINE

## 2023-01-08 PROCEDURE — 3008F PR BODY MASS INDEX (BMI) DOCUMENTED: ICD-10-PCS | Mod: CPTII,S$GLB,, | Performed by: FAMILY MEDICINE

## 2023-01-08 PROCEDURE — 1160F RVW MEDS BY RX/DR IN RCRD: CPT | Mod: CPTII,S$GLB,, | Performed by: FAMILY MEDICINE

## 2023-01-08 PROCEDURE — 99203 PR OFFICE/OUTPT VISIT, NEW, LEVL III, 30-44 MIN: ICD-10-PCS | Mod: S$GLB,,, | Performed by: FAMILY MEDICINE

## 2023-01-08 PROCEDURE — 87502 POCT INFLUENZA A/B MOLECULAR: ICD-10-PCS | Mod: QW,S$GLB,, | Performed by: FAMILY MEDICINE

## 2023-01-08 PROCEDURE — 87811 SARS CORONAVIRUS 2 ANTIGEN POCT, MANUAL READ: ICD-10-PCS | Mod: QW,S$GLB,, | Performed by: FAMILY MEDICINE

## 2023-01-08 PROCEDURE — 3078F DIAST BP <80 MM HG: CPT | Mod: CPTII,S$GLB,, | Performed by: FAMILY MEDICINE

## 2023-01-08 PROCEDURE — 87811 SARS-COV-2 COVID19 W/OPTIC: CPT | Mod: QW,S$GLB,, | Performed by: FAMILY MEDICINE

## 2023-01-08 PROCEDURE — 3078F PR MOST RECENT DIASTOLIC BLOOD PRESSURE < 80 MM HG: ICD-10-PCS | Mod: CPTII,S$GLB,, | Performed by: FAMILY MEDICINE

## 2023-01-08 PROCEDURE — 3074F SYST BP LT 130 MM HG: CPT | Mod: CPTII,S$GLB,, | Performed by: FAMILY MEDICINE

## 2023-01-08 PROCEDURE — 3008F BODY MASS INDEX DOCD: CPT | Mod: CPTII,S$GLB,, | Performed by: FAMILY MEDICINE

## 2023-01-08 PROCEDURE — 99203 OFFICE O/P NEW LOW 30 MIN: CPT | Mod: S$GLB,,, | Performed by: FAMILY MEDICINE

## 2023-01-08 PROCEDURE — 87502 INFLUENZA DNA AMP PROBE: CPT | Mod: QW,S$GLB,, | Performed by: FAMILY MEDICINE

## 2023-01-08 PROCEDURE — 1160F PR REVIEW ALL MEDS BY PRESCRIBER/CLIN PHARMACIST DOCUMENTED: ICD-10-PCS | Mod: CPTII,S$GLB,, | Performed by: FAMILY MEDICINE

## 2023-01-08 RX ORDER — BENZONATATE 200 MG/1
200 CAPSULE ORAL 3 TIMES DAILY PRN
Qty: 30 CAPSULE | Refills: 0 | Status: SHIPPED | OUTPATIENT
Start: 2023-01-08 | End: 2023-01-18

## 2023-01-08 NOTE — PROGRESS NOTES
Subjective:       Patient ID: Poornima Anaya is a 28 y.o. female.    Vitals:  vitals were not taken for this visit.     Chief Complaint: No chief complaint on file.    Patient reports sinus headaches, sore throat,  nasal congestion and post nasal drip with cough since last night. 3/10 pain with sinus pressure. Patient has taken mucinex DM and sudafed for symptoms which has helped. 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     Cough  This is a new problem. The current episode started yesterday. The problem has been gradually worsening. The problem occurs every few minutes. The cough is Non-productive. Associated symptoms include headaches, nasal congestion, postnasal drip and a sore throat. Pertinent negatives include no fever. She has tried OTC cough suppressant and rest for the symptoms. The treatment provided moderate relief.     Constitution: Negative for fever.   HENT:  Positive for postnasal drip and sore throat.    Respiratory:  Positive for cough.    Neurological:  Positive for headaches.     Objective:      Physical Exam   Constitutional: She is oriented to person, place, and time. She appears well-developed. She is cooperative.  Non-toxic appearance. She does not appear ill. No distress.   HENT:   Head: Normocephalic and atraumatic.   Ears:   Right Ear: Hearing, tympanic membrane, external ear and ear canal normal.   Left Ear: Hearing, tympanic membrane, external ear and ear canal normal.   Nose: Nose normal. No mucosal edema, rhinorrhea or nasal deformity. No epistaxis. Right sinus exhibits no maxillary sinus tenderness and no frontal sinus tenderness. Left sinus exhibits no maxillary sinus tenderness and no frontal sinus tenderness.   Mouth/Throat: Uvula is midline and mucous membranes are normal. No trismus in the jaw. Normal dentition. No uvula swelling. Posterior oropharyngeal erythema present. No oropharyngeal exudate or posterior oropharyngeal edema.   Eyes: Conjunctivae  and lids are normal. No scleral icterus.   Neck: Trachea normal and phonation normal. Neck supple. No edema present. No erythema present. No neck rigidity present.   Cardiovascular: Normal rate, regular rhythm, normal heart sounds and normal pulses.   Pulmonary/Chest: Effort normal and breath sounds normal. No respiratory distress. She has no decreased breath sounds. She has no rhonchi.   Abdominal: Normal appearance.   Musculoskeletal: Normal range of motion.         General: No deformity. Normal range of motion.   Neurological: She is alert and oriented to person, place, and time. She exhibits normal muscle tone. Coordination normal.   Skin: Skin is warm, dry, intact, not diaphoretic and not pale.   Psychiatric: Her speech is normal and behavior is normal. Judgment and thought content normal.   Nursing note and vitals reviewed.      Results for orders placed or performed in visit on 01/08/23   POCT Influenza A/B MOLECULAR   Result Value Ref Range    POC Molecular Influenza A Ag Negative Negative, Not Reported    POC Molecular Influenza B Ag Negative Negative, Not Reported     Acceptable Yes    SARS Coronavirus 2 Antigen, POCT Manual Read   Result Value Ref Range    SARS Coronavirus 2 Antigen Negative Negative     Acceptable Yes        Assessment:       1. Viral URI with cough    2. Seasonal allergies          Plan:         Viral URI with cough  -     POCT Influenza A/B MOLECULAR  -     SARS Coronavirus 2 Antigen, POCT Manual Read    Seasonal allergies    Other orders  -     benzonatate (TESSALON) 200 MG capsule; Take 1 capsule (200 mg total) by mouth 3 (three) times daily as needed for Cough.  Dispense: 30 capsule; Refill: 0    Pt to continue taking her flonase and zyrtec daily as well

## 2023-01-10 ENCOUNTER — OFFICE VISIT (OUTPATIENT)
Dept: PRIMARY CARE CLINIC | Facility: CLINIC | Age: 28
End: 2023-01-10
Attending: FAMILY MEDICINE
Payer: COMMERCIAL

## 2023-01-10 VITALS
WEIGHT: 155.56 LBS | BODY MASS INDEX: 29.37 KG/M2 | SYSTOLIC BLOOD PRESSURE: 128 MMHG | OXYGEN SATURATION: 99 % | HEIGHT: 61 IN | HEART RATE: 106 BPM | DIASTOLIC BLOOD PRESSURE: 88 MMHG

## 2023-01-10 DIAGNOSIS — J01.00 ACUTE NON-RECURRENT MAXILLARY SINUSITIS: Primary | ICD-10-CM

## 2023-01-10 PROCEDURE — 1160F RVW MEDS BY RX/DR IN RCRD: CPT | Mod: CPTII,S$GLB,, | Performed by: FAMILY MEDICINE

## 2023-01-10 PROCEDURE — 99214 OFFICE O/P EST MOD 30 MIN: CPT | Mod: S$GLB,,, | Performed by: FAMILY MEDICINE

## 2023-01-10 PROCEDURE — 1159F MED LIST DOCD IN RCRD: CPT | Mod: CPTII,S$GLB,, | Performed by: FAMILY MEDICINE

## 2023-01-10 PROCEDURE — 3074F SYST BP LT 130 MM HG: CPT | Mod: CPTII,S$GLB,, | Performed by: FAMILY MEDICINE

## 2023-01-10 PROCEDURE — 3008F BODY MASS INDEX DOCD: CPT | Mod: CPTII,S$GLB,, | Performed by: FAMILY MEDICINE

## 2023-01-10 PROCEDURE — 3008F PR BODY MASS INDEX (BMI) DOCUMENTED: ICD-10-PCS | Mod: CPTII,S$GLB,, | Performed by: FAMILY MEDICINE

## 2023-01-10 PROCEDURE — 99999 PR PBB SHADOW E&M-EST. PATIENT-LVL III: CPT | Mod: PBBFAC,,, | Performed by: FAMILY MEDICINE

## 2023-01-10 PROCEDURE — 1160F PR REVIEW ALL MEDS BY PRESCRIBER/CLIN PHARMACIST DOCUMENTED: ICD-10-PCS | Mod: CPTII,S$GLB,, | Performed by: FAMILY MEDICINE

## 2023-01-10 PROCEDURE — 3079F DIAST BP 80-89 MM HG: CPT | Mod: CPTII,S$GLB,, | Performed by: FAMILY MEDICINE

## 2023-01-10 PROCEDURE — 3079F PR MOST RECENT DIASTOLIC BLOOD PRESSURE 80-89 MM HG: ICD-10-PCS | Mod: CPTII,S$GLB,, | Performed by: FAMILY MEDICINE

## 2023-01-10 PROCEDURE — 3074F PR MOST RECENT SYSTOLIC BLOOD PRESSURE < 130 MM HG: ICD-10-PCS | Mod: CPTII,S$GLB,, | Performed by: FAMILY MEDICINE

## 2023-01-10 PROCEDURE — 99999 PR PBB SHADOW E&M-EST. PATIENT-LVL III: ICD-10-PCS | Mod: PBBFAC,,, | Performed by: FAMILY MEDICINE

## 2023-01-10 PROCEDURE — 1159F PR MEDICATION LIST DOCUMENTED IN MEDICAL RECORD: ICD-10-PCS | Mod: CPTII,S$GLB,, | Performed by: FAMILY MEDICINE

## 2023-01-10 PROCEDURE — 99214 PR OFFICE/OUTPT VISIT, EST, LEVL IV, 30-39 MIN: ICD-10-PCS | Mod: S$GLB,,, | Performed by: FAMILY MEDICINE

## 2023-01-10 RX ORDER — AZITHROMYCIN 250 MG/1
TABLET, FILM COATED ORAL
Qty: 6 TABLET | Refills: 0 | Status: SHIPPED | OUTPATIENT
Start: 2023-01-10 | End: 2023-01-15

## 2023-01-10 RX ORDER — PROMETHAZINE HYDROCHLORIDE AND DEXTROMETHORPHAN HYDROBROMIDE 6.25; 15 MG/5ML; MG/5ML
5 SYRUP ORAL EVERY 4 HOURS PRN
Qty: 473 ML | Refills: 0 | Status: SHIPPED | OUTPATIENT
Start: 2023-01-10 | End: 2023-01-20

## 2023-01-10 NOTE — PROGRESS NOTES
FAMILY MEDICINE  [facility]    Reason for visit:   Chief Complaint   Patient presents with    Cough    URI       HPI: Poornima Anaya is a 28 y.o. female  - with chronic migraines presents for sinus pain, pressure and cough    Gynecology: Dr. Yulisa Richter MD  Neurology:Marily Zhang, KRAEN    Poornima Anaya reports that been feeling ill for the last 4-5 days.  She reports the symptoms initially started with a scratchy throat and sinus congestion.  She was seen in urgent care 01/08/2023 and was tested for COVID-19 and flu.  Both were negative.  She was diagnosed with an upper respiratory infection and treated with supportive care and Tessalon Perles.    However she reports the symptoms have been worsening since the urgent care visit.  Her cough is hacking and somewhat productive with yellow to clear sputum.  She reports increased green sputum out of her nasal passages with pain overlying her maxillary sinuses.  She is using over-the-counter Mucinex DM, Sudafed as well as nasal saline rinse without any relief.  She denies any fevers.  She denies any headache, nausea or vomiting.  She is taking acetaminophen 500 mg over-the-counter for her sore throat and sinus pressure.        Review of Systems   All other systems reviewed and are negative.    Social History     Social History Narrative    . No children. 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 Grew up in the [facility]. Parents now live in [place], [place]. She moved back to [facility] 6/2022         ALLERGIES:   Review of patient's allergies indicates:   Allergen Reactions    Penicillins Hives       MEDS:     Current Outpatient Medications:     benzonatate (TESSALON) 200 MG capsule, Take 1 capsule (200 mg total) by mouth 3 (three) times daily as needed for Cough., Disp: 30 capsule, Rfl: 0    cetirizine (ZYRTEC) 10 mg Cap, , Disp: , Rfl:     erenumab-aooe (AIMOVIG AUTOINJECTOR) 70 mg/mL autoinjector, Inject 1 mL (70 mg total) into the skin every  "28 days., Disp: 1 each, Rfl: 11    fluticasone propionate (FLONASE ALLERGY RELIEF NASL), , Disp: , Rfl:     pseudoephedrine (SUDAFED) 120 mg 12 hr tablet, Take 120 mg by mouth every 12 (twelve) hours as needed., Disp: , Rfl:     sumatriptan (IMITREX) 100 MG tablet, Take 1 tablet (100 mg total) by mouth 2 (two) times daily as needed for Migraine., Disp: 12 tablet, Rfl: 11    azithromycin (Z-VERENA) 250 MG tablet, Take 2 tablets by mouth on day 1; Take 1 tablet by mouth on days 2-5, Disp: 6 tablet, Rfl: 0    promethazine-dextromethorphan (PROMETHAZINE-DM) 6.25-15 mg/5 mL Syrp, Take 5 mLs by mouth every 4 (four) hours as needed (cough)., Disp: 473 mL, Rfl: 0    Vital signs:   Vitals:    01/10/23 1507   BP: 128/88   Pulse: 106   SpO2: 99%   Weight: 70.5 kg (155 lb 8.6 oz)   Height: 5' 1" (1.549 m)     Body mass index is 29.39 kg/m².    PHYSICAL EXAM:     Physical Exam  Constitutional:       General: She is not in acute distress.  HENT:      Right Ear: Ear canal normal. A middle ear effusion (clear) is present. Tympanic membrane is retracted. Tympanic membrane is not perforated, erythematous or bulging.      Left Ear: Ear canal normal. A middle ear effusion (clear) is present. Tympanic membrane is retracted. Tympanic membrane is not perforated, erythematous or bulging.      Nose: Congestion and rhinorrhea present. Rhinorrhea is purulent.      Right Sinus: Maxillary sinus tenderness present. No frontal sinus tenderness.      Left Sinus: Maxillary sinus tenderness present. No frontal sinus tenderness.      Mouth/Throat:      Lips: Pink.      Mouth: Mucous membranes are moist.   Cardiovascular:      Rate and Rhythm: Normal rate and regular rhythm.      Pulses: Normal pulses.   Pulmonary:      Effort: Pulmonary effort is normal. No respiratory distress.      Breath sounds: No wheezing, rhonchi or rales.   Musculoskeletal:      Cervical back: Neck supple.   Lymphadenopathy:      Cervical: Cervical adenopathy present.      Left " cervical: Superficial cervical adenopathy present.   Neurological:      Mental Status: She is alert.   Psychiatric:         Speech: Speech normal.           PERTINENT RESULTS:   Office Visit on 01/08/2023   Component Date Value Ref Range Status    POC Molecular Influenza A Ag 01/08/2023 Negative  Negative, Not Reported Final    POC Molecular Influenza B Ag 01/08/2023 Negative  Negative, Not Reported Final     Acceptable 01/08/2023 Yes   Final    SARS Coronavirus 2 Antigen 01/08/2023 Negative  Negative Final     Acceptable 01/08/2023 Yes   Final       ASSESSMENT/PLAN:  1. Acute non-recurrent maxillary sinusitis  Overview:  - supportive care  -continue nasal saline rinses   -azithromycin 500 mg once then 250 mg daily for 5 days total     Orders:  -     promethazine-dextromethorphan (PROMETHAZINE-DM) 6.25-15 mg/5 mL Syrp; Take 5 mLs by mouth every 4 (four) hours as needed (cough).  Dispense: 473 mL; Refill: 0  -     azithromycin (Z-VERENA) 250 MG tablet; Take 2 tablets by mouth on day 1; Take 1 tablet by mouth on days 2-5  Dispense: 6 tablet; Refill: 0          Vaccines recommended: covid-19 booster when recovered    Follow-up as needed.    This note is dictated using the M*Modal Fluency Direct word recognition program. There are word recognition mistakes that are occasionally missed on review.    Dr. Domitila Ceballos D.O.   [facility]

## 2023-02-07 ENCOUNTER — OFFICE VISIT (OUTPATIENT)
Dept: ALLERGY | Facility: CLINIC | Age: 28
End: 2023-02-07
Payer: COMMERCIAL

## 2023-02-07 ENCOUNTER — LAB VISIT (OUTPATIENT)
Dept: LAB | Facility: HOSPITAL | Age: 28
End: 2023-02-07
Attending: STUDENT IN AN ORGANIZED HEALTH CARE EDUCATION/TRAINING PROGRAM
Payer: COMMERCIAL

## 2023-02-07 ENCOUNTER — TELEPHONE (OUTPATIENT)
Dept: ALLERGY | Facility: CLINIC | Age: 28
End: 2023-02-07
Payer: COMMERCIAL

## 2023-02-07 VITALS
WEIGHT: 155.44 LBS | OXYGEN SATURATION: 98 % | DIASTOLIC BLOOD PRESSURE: 83 MMHG | BODY MASS INDEX: 29.37 KG/M2 | SYSTOLIC BLOOD PRESSURE: 121 MMHG | HEART RATE: 95 BPM

## 2023-02-07 DIAGNOSIS — J31.0 CHRONIC RHINITIS: ICD-10-CM

## 2023-02-07 DIAGNOSIS — R51.9 FACE PAIN: ICD-10-CM

## 2023-02-07 DIAGNOSIS — G47.19 EXCESSIVE DAYTIME SLEEPINESS: ICD-10-CM

## 2023-02-07 DIAGNOSIS — J31.0 CHRONIC RHINITIS: Primary | ICD-10-CM

## 2023-02-07 DIAGNOSIS — R05.3 CHRONIC COUGH: ICD-10-CM

## 2023-02-07 DIAGNOSIS — K21.9 GASTROESOPHAGEAL REFLUX DISEASE, UNSPECIFIED WHETHER ESOPHAGITIS PRESENT: ICD-10-CM

## 2023-02-07 DIAGNOSIS — H69.93 DYSFUNCTION OF BOTH EUSTACHIAN TUBES: ICD-10-CM

## 2023-02-07 LAB — IGE SERPL-ACNC: <35 IU/ML (ref 0–100)

## 2023-02-07 PROCEDURE — 3079F DIAST BP 80-89 MM HG: CPT | Mod: CPTII,S$GLB,, | Performed by: STUDENT IN AN ORGANIZED HEALTH CARE EDUCATION/TRAINING PROGRAM

## 2023-02-07 PROCEDURE — 1160F RVW MEDS BY RX/DR IN RCRD: CPT | Mod: CPTII,S$GLB,, | Performed by: STUDENT IN AN ORGANIZED HEALTH CARE EDUCATION/TRAINING PROGRAM

## 2023-02-07 PROCEDURE — 1159F MED LIST DOCD IN RCRD: CPT | Mod: CPTII,S$GLB,, | Performed by: STUDENT IN AN ORGANIZED HEALTH CARE EDUCATION/TRAINING PROGRAM

## 2023-02-07 PROCEDURE — 86003 ALLG SPEC IGE CRUDE XTRC EA: CPT | Performed by: STUDENT IN AN ORGANIZED HEALTH CARE EDUCATION/TRAINING PROGRAM

## 2023-02-07 PROCEDURE — 1160F PR REVIEW ALL MEDS BY PRESCRIBER/CLIN PHARMACIST DOCUMENTED: ICD-10-PCS | Mod: CPTII,S$GLB,, | Performed by: STUDENT IN AN ORGANIZED HEALTH CARE EDUCATION/TRAINING PROGRAM

## 2023-02-07 PROCEDURE — 1159F PR MEDICATION LIST DOCUMENTED IN MEDICAL RECORD: ICD-10-PCS | Mod: CPTII,S$GLB,, | Performed by: STUDENT IN AN ORGANIZED HEALTH CARE EDUCATION/TRAINING PROGRAM

## 2023-02-07 PROCEDURE — 99999 PR PBB SHADOW E&M-EST. PATIENT-LVL IV: ICD-10-PCS | Mod: PBBFAC,,, | Performed by: STUDENT IN AN ORGANIZED HEALTH CARE EDUCATION/TRAINING PROGRAM

## 2023-02-07 PROCEDURE — 86003 ALLG SPEC IGE CRUDE XTRC EA: CPT | Mod: 59 | Performed by: STUDENT IN AN ORGANIZED HEALTH CARE EDUCATION/TRAINING PROGRAM

## 2023-02-07 PROCEDURE — 82785 ASSAY OF IGE: CPT | Performed by: STUDENT IN AN ORGANIZED HEALTH CARE EDUCATION/TRAINING PROGRAM

## 2023-02-07 PROCEDURE — 3074F SYST BP LT 130 MM HG: CPT | Mod: CPTII,S$GLB,, | Performed by: STUDENT IN AN ORGANIZED HEALTH CARE EDUCATION/TRAINING PROGRAM

## 2023-02-07 PROCEDURE — 3074F PR MOST RECENT SYSTOLIC BLOOD PRESSURE < 130 MM HG: ICD-10-PCS | Mod: CPTII,S$GLB,, | Performed by: STUDENT IN AN ORGANIZED HEALTH CARE EDUCATION/TRAINING PROGRAM

## 2023-02-07 PROCEDURE — 3079F PR MOST RECENT DIASTOLIC BLOOD PRESSURE 80-89 MM HG: ICD-10-PCS | Mod: CPTII,S$GLB,, | Performed by: STUDENT IN AN ORGANIZED HEALTH CARE EDUCATION/TRAINING PROGRAM

## 2023-02-07 PROCEDURE — 3008F BODY MASS INDEX DOCD: CPT | Mod: CPTII,S$GLB,, | Performed by: STUDENT IN AN ORGANIZED HEALTH CARE EDUCATION/TRAINING PROGRAM

## 2023-02-07 PROCEDURE — 3008F PR BODY MASS INDEX (BMI) DOCUMENTED: ICD-10-PCS | Mod: CPTII,S$GLB,, | Performed by: STUDENT IN AN ORGANIZED HEALTH CARE EDUCATION/TRAINING PROGRAM

## 2023-02-07 PROCEDURE — 99999 PR PBB SHADOW E&M-EST. PATIENT-LVL IV: CPT | Mod: PBBFAC,,, | Performed by: STUDENT IN AN ORGANIZED HEALTH CARE EDUCATION/TRAINING PROGRAM

## 2023-02-07 PROCEDURE — 36415 COLL VENOUS BLD VENIPUNCTURE: CPT | Performed by: STUDENT IN AN ORGANIZED HEALTH CARE EDUCATION/TRAINING PROGRAM

## 2023-02-07 PROCEDURE — 99204 OFFICE O/P NEW MOD 45 MIN: CPT | Mod: S$GLB,,, | Performed by: STUDENT IN AN ORGANIZED HEALTH CARE EDUCATION/TRAINING PROGRAM

## 2023-02-07 PROCEDURE — 99204 PR OFFICE/OUTPT VISIT, NEW, LEVL IV, 45-59 MIN: ICD-10-PCS | Mod: S$GLB,,, | Performed by: STUDENT IN AN ORGANIZED HEALTH CARE EDUCATION/TRAINING PROGRAM

## 2023-02-07 RX ORDER — FLUTICASONE PROPIONATE 50 MCG
2 SPRAY, SUSPENSION (ML) NASAL 2 TIMES DAILY
Qty: 31.6 ML | Refills: 5 | Status: SHIPPED | OUTPATIENT
Start: 2023-02-07

## 2023-02-07 NOTE — PATIENT INSTRUCTIONS
Testing  Blood work for allergy testing today       Check [facility] in one week for results or call [phone]       Contact me with questions or concerns       I will contact you if anything needs immediate attention.    Agree with plans for sleep evaluation    Treatment    Increase Flonase (= fluticasone) nasal spray:  2 squirts each nostril twice a day   Remember to aim out toward your ear.   Needs to be used regularly 5-14 days for full effect.    Continue other medicines as needed    Stop Zyrtec 7 days before skin testing.  Also all other antihistamines (Benadryl, Theraflu, Nyquil, etc)

## 2023-02-07 NOTE — PROGRESS NOTES
Allergy Clinic Note  [facility]    This note was created by combination of typed  and M-Modal dictation. Transcription errors may be present.  If there are any questions, please contact me.    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    Chief Complaint: Allergies (Patient moved in from [facility] in July, has been ill one month due to allergy infection, Query Viral, post nasal drip, mixed clear and green mucus. 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  She is here alone, and she is a very good historian.    Related medications and other interventions  Flonase 1-2 squirts each nostril daily  Zyrtec as needed  Sudafed as needed  Mucinex as needed  Nasal rinses as needed  Status post allergen immunotherapy [place]     02/07/2023:  At initial visit, client's chief complaints were nasal congestion and bilateral ear fullness.  Another major symptom is face pain between her eyebrows.  Additional symptoms include runny nose, postnasal drip sensation, sneezing, coughing.  She denies eye symptoms, throat clearing, wheezing, shortness of breath, chest tightness.  Symptoms have been present for life but especially bad for the last month.  When symptoms are severe she has sinus infections approximately twice a year with an allergic trigger.  No clear precipitants or patterns.  She was treated with allergy shots from January 2019 through February 2022.  She achieved maintenance doses and noticed a decrease in severity and frequency of infections but was still requiring Flonase and Zyrtec on a daily basis.  Allergy testing from 2018 is positive by prick to dust mite, weed, grass, oak, and pecan pollen.  She had several additional positives by intradermal testing.  More recent skin  testing was inadequate due to poor histamine response.  She is interested in restarting vaccines for symptoms not adequately controlled on multiple medications.    Client would also like to address her history of penicillin allergy.      MEDICAL HISTORY      Significant past medical history:  Migraine headache  Active Problem List reviewed  ENT surgery:  PE tubes, tonsillectomy   Significant family history:  Allergies in mother, brother, and sister.  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 She has never been exposed to tobacco smoke. She has never used smokeless tobacco.    Meds:  MAR reviewed    Asthma:  No  Eczema:  No  Rhinitis:  Yes--see HPI  Drug allergy/intolerance:  Labeled allergic to penicillin   Venom allergy: No  Latex allergy:  No    Patient Active Problem List   Diagnosis    Migraine without aura and without status migrainosus, not intractable    Excessive daytime sleepiness    Polyarthralgia    Tachycardia    Fatigue    Myalgia    Weight gain    Chronic rhinitis    Elevated cortisol level    Chronic neck pain    Acute non-recurrent maxillary sinusitis     Medication List with Changes/Refills   New Medications    FLUTICASONE PROPIONATE (FLONASE) 50 MCG/ACTUATION NASAL SPRAY    2 sprays (100 mcg total) by Each Nostril route 2 (two) times daily.       Start Date: 2/7/2023  End Date: --   Current Medications    CETIRIZINE (ZYRTEC) 10 MG CAP           Start Date: --        End Date: --    ERENUMAB-AOOE (AIMOVIG AUTOINJECTOR) 70 MG/ML AUTOINJECTOR    Inject 1 mL (70 mg total) into the skin every 28 days.       Start Date: 12/13/2022End Date: --    PSEUDOEPHEDRINE (SUDAFED) 120 MG 12 HR TABLET    Take 120 mg by mouth every 12 (twelve) hours as needed.       Start Date: --        End Date: --    SUMATRIPTAN (IMITREX) 100 MG TABLET    Take 1 tablet (100 mg total) by mouth 2 (two) times  daily as needed for Migraine.       Start Date: 10/11/2022End Date: --   Discontinued Medications    FLUTICASONE PROPIONATE (FLONASE ALLERGY RELIEF NASL)           Start Date: --        End Date: 2/7/2023           REVIEW OF SYSTEMS      CONST: no F/C/NS, no unintentional weight changes  NEURO:  no tremor, no weakness  EYES: no discharge, no pruritus, no erythema  EARS: no hearing loss, + sensation of fullness  NOSE: + congestion, no rhinorrhea, no sneezing  PULM:  no SOB, no wheezing, + cough  CV: no CP, no palpitations, no leg swelling  GI:  no abdominal pain, no blood in stool  :  no dysuria, no hematuria  DERM: no rashes, no skin breaks    Objective:     Physical Exam:     /83 (BP Location: Right arm, Patient Position: Sitting, BP Method: Medium (Automatic))   Pulse 95   Wt 70.5 kg (155 lb 6.8 oz)   LMP  (LMP Unknown)   SpO2 98%   BMI 29.37 kg/m²   GEN: Awake and alert, no distress  DERM: No flushing, no rashes  EYE:  No occular discharge, no redness  HEENT: No nasal discharge, no hoarseness, right TM well seen:  Opaque pet scar around 10:00.  Toward the center is a [place] which could represent a bubble or could reflect a defect in the TM.  TM is otherwise normal with ossicles well-visualized.  Nares are not well seen Oropharynx is benign without exudate.  Tongue is not coated.  NECK:  No LAD  PULM: Normal work of breathing, no cough  NEURO:  No focal deficit, speech fluent and logical  PSYCH: appropriate affect, normal behavior        Allergy Testing            Lab results            Imaging and other diagnostics            Medical records review          Medical Decision-Making       Diagnoses:     Poornima Anaya is a 28 y.o. female. with  1. Chronic rhinitis    2. Dysfunction of both eustachian tubes    3. Face pain    4. Chronic cough    5. Gastroesophageal reflux disease, unspecified whether esophagitis present    6. Excessive daytime sleepiness          Plan:   Patient is presenting with  chronic rhinitis and previous positive skin tests as well as previous allergen immunotherapy.  Current symptoms of nasal congestion, eustachian tube dysfunction, and face pain are not adequately controlled on a combination of multiple medications.  She is interested in restarting allergy vaccines.      Chronic rhinitis  -     Ambulatory referral/consult to Allergy  -     IgE; Future; Expected date: 02/07/2023  -     Dermatophagoides [place]; Future; Expected date: 02/07/2023  -     Dermatophagoides Pteronyssinus; Future; Expected date: 02/07/2023  -     [place]; Future; Expected date: 02/07/2023  -     Josh; Future; Expected date: 02/07/2023  -     Bay; Future; Expected date: 02/07/2023  -     English Plantain; Future; Expected date: 02/07/2023  -     [place] Pecan; Future; Expected date: 02/07/2023  -     Pecan; Future; Expected date: 02/07/2023  -     Ragweed; Future; Expected date: 02/07/2023  -     Alternaria; Future; Expected date: 02/07/2023  -     Aspergillus; Future; Expected date: 02/07/2023  -     Cat; Future; Expected date: 02/07/2023  -     Cockroach; Future; Expected date: 02/07/2023  -     Dog; Future; Expected date: 02/07/2023  -     fluticasone propionate (FLONASE) 50 mcg/actuation nasal spray; 2 sprays (100 mcg total) by Each Nostril route 2 (two) times daily.  Dispense: 31.6 mL; Refill: 5    Dysfunction of both eustachian tubes  -     fluticasone propionate (FLONASE) 50 mcg/actuation nasal spray; 2 sprays (100 mcg total) by Each Nostril route 2 (two) times daily.  Dispense: 31.6 mL; Refill: 5    Face pain  -     fluticasone propionate (FLONASE) 50 mcg/actuation nasal spray; 2 sprays (100 mcg total) by Each Nostril route 2 (two) times daily.  Dispense: 31.6 mL; Refill: 5    Chronic cough -do not suspect asthma or other cardiopulmonary etiology    Excessive daytime sleepiness         -sleep evaluation      Comorbidities  GERD -may be cause for cough and or postnasal drip sensation  Migraine  headache    Patient Instructions and follow up     Patient Instructions   Testing  Blood work for allergy testing today       Check [facility] in one week for results or call [phone]       Contact me with questions or concerns       I will contact you if anything needs immediate attention.    Agree with plans for sleep evaluation    Treatment    Increase Flonase (= fluticasone) nasal spray:  2 squirts each nostril twice a day   Remember to aim out toward your ear.   Needs to be used regularly 5-14 days for full effect.    Continue other medicines as needed    Stop Zyrtec 7 days before skin testing.  Also all other antihistamines (Benadryl, Theraflu, Nyquil, etc)    Follow-up 2 weeks        Leanne Thakur MD  Allergy, Asthma & Immunology    I spent a total of 57 minutes on the day of the visit.This includes face to face time and non-face to face time preparing to see the patient (e.g., review of tests), obtaining and/or reviewing separately obtained history, documenting clinical information in the electronic or other health record, independently interpreting results and communicating results to the patient/family/caregiver, or care coordinator.

## 2023-02-10 LAB
A ALTERNATA IGE QN: <0.1 KU/L
A FUMIGATUS IGE QN: <0.1 KU/L
BERMUDA GRASS IGE QN: 0.94 KU/L
CAT DANDER IGE QN: <0.1 KU/L
CEDAR IGE QN: <0.1 KU/L
D FARINAE IGE QN: 2.12 KU/L
D PTERONYSS IGE QN: 1.56 KU/L
DEPRECATED A ALTERNATA IGE RAST QL: NORMAL
DEPRECATED A FUMIGATUS IGE RAST QL: NORMAL
DEPRECATED BERMUDA GRASS IGE RAST QL: ABNORMAL
DEPRECATED CAT DANDER IGE RAST QL: NORMAL
DEPRECATED CEDAR IGE RAST QL: NORMAL
DEPRECATED D FARINAE IGE RAST QL: ABNORMAL
DEPRECATED D PTERONYSS IGE RAST QL: ABNORMAL
DEPRECATED DOG DANDER IGE RAST QL: NORMAL
DEPRECATED ENGL PLANTAIN IGE RAST QL: NORMAL
DEPRECATED PECAN/HICK TREE IGE RAST QL: ABNORMAL
DEPRECATED ROACH IGE RAST QL: NORMAL
DEPRECATED TIMOTHY IGE RAST QL: ABNORMAL
DEPRECATED WEST RAGWEED IGE RAST QL: ABNORMAL
DEPRECATED WHITE OAK IGE RAST QL: ABNORMAL
DOG DANDER IGE QN: <0.1 KU/L
ENGL PLANTAIN IGE QN: <0.1 KU/L
PECAN/HICK TREE IGE QN: 0.12 KU/L
ROACH IGE QN: <0.1 KU/L
TIMOTHY IGE QN: 0.6 KU/L
WEST RAGWEED IGE QN: 0.16 KU/L
WHITE OAK IGE QN: 0.27 KU/L

## 2023-03-15 ENCOUNTER — OFFICE VISIT (OUTPATIENT)
Dept: SLEEP MEDICINE | Facility: CLINIC | Age: 28
End: 2023-03-15
Attending: FAMILY MEDICINE
Payer: COMMERCIAL

## 2023-03-15 VITALS
HEART RATE: 96 BPM | HEIGHT: 61 IN | WEIGHT: 153.88 LBS | DIASTOLIC BLOOD PRESSURE: 74 MMHG | BODY MASS INDEX: 29.05 KG/M2 | SYSTOLIC BLOOD PRESSURE: 113 MMHG

## 2023-03-15 DIAGNOSIS — G47.30 SLEEP APNEA, UNSPECIFIED TYPE: Primary | ICD-10-CM

## 2023-03-15 DIAGNOSIS — G43.009 MIGRAINE WITHOUT AURA AND WITHOUT STATUS MIGRAINOSUS, NOT INTRACTABLE: ICD-10-CM

## 2023-03-15 DIAGNOSIS — G47.19 EXCESSIVE DAYTIME SLEEPINESS: ICD-10-CM

## 2023-03-15 PROCEDURE — 3074F PR MOST RECENT SYSTOLIC BLOOD PRESSURE < 130 MM HG: ICD-10-PCS | Mod: CPTII,S$GLB,, | Performed by: NURSE PRACTITIONER

## 2023-03-15 PROCEDURE — 3008F PR BODY MASS INDEX (BMI) DOCUMENTED: ICD-10-PCS | Mod: CPTII,S$GLB,, | Performed by: NURSE PRACTITIONER

## 2023-03-15 PROCEDURE — 1159F PR MEDICATION LIST DOCUMENTED IN MEDICAL RECORD: ICD-10-PCS | Mod: CPTII,S$GLB,, | Performed by: NURSE PRACTITIONER

## 2023-03-15 PROCEDURE — 99213 OFFICE O/P EST LOW 20 MIN: CPT | Mod: S$GLB,,, | Performed by: NURSE PRACTITIONER

## 2023-03-15 PROCEDURE — 3078F DIAST BP <80 MM HG: CPT | Mod: CPTII,S$GLB,, | Performed by: NURSE PRACTITIONER

## 2023-03-15 PROCEDURE — 1159F MED LIST DOCD IN RCRD: CPT | Mod: CPTII,S$GLB,, | Performed by: NURSE PRACTITIONER

## 2023-03-15 PROCEDURE — 3074F SYST BP LT 130 MM HG: CPT | Mod: CPTII,S$GLB,, | Performed by: NURSE PRACTITIONER

## 2023-03-15 PROCEDURE — 3008F BODY MASS INDEX DOCD: CPT | Mod: CPTII,S$GLB,, | Performed by: NURSE PRACTITIONER

## 2023-03-15 PROCEDURE — 99213 PR OFFICE/OUTPT VISIT, EST, LEVL III, 20-29 MIN: ICD-10-PCS | Mod: S$GLB,,, | Performed by: NURSE PRACTITIONER

## 2023-03-15 PROCEDURE — 3078F PR MOST RECENT DIASTOLIC BLOOD PRESSURE < 80 MM HG: ICD-10-PCS | Mod: CPTII,S$GLB,, | Performed by: NURSE PRACTITIONER

## 2023-03-15 PROCEDURE — 99999 PR PBB SHADOW E&M-EST. PATIENT-LVL III: ICD-10-PCS | Mod: PBBFAC,,, | Performed by: NURSE PRACTITIONER

## 2023-03-15 PROCEDURE — 99999 PR PBB SHADOW E&M-EST. PATIENT-LVL III: CPT | Mod: PBBFAC,,, | Performed by: NURSE PRACTITIONER

## 2023-03-15 NOTE — PROGRESS NOTES
"Referred by Dr. Ceballos  CHIEF COMPLAINT: Excessive daytime sleepiness    HISTORY OF PRESENT ILLNESS:She has never had a sleep study. She is always been sleepy since [place]. Would fall asleep in classes.got A's in school.will snooze 30-45min to alarm in mornings. Sleep consolidated but  reports she moves around often/tosses and turns.Hard to get out of bed. Has coffee/espresso in am and during daytime. Sleeps 8-9hrs. Prioritizes sleep time as lack of sleep can trigger migraines. At times has overwhelming urge to nap despite just having ingested caffeine. On 45min drives she would fall asleep and have to sing to stay awake/knew strategies     Denies cataplexy, hypnagogic or hypnopompic hallucinations, or sleep paralysis. +vivid dreams at times/feels very real  Denies oral drying in am  +sinus, switched zyrtec from am to bedtime adm  Migraines less frequent on Aimovig ~ 4x/month  ADHD no formal dx (all family members dx), Focalin takes infrequently but it helps reduce EDS/improves focus when/if has to get work done, prescribed by neuro in [place]  Denies symptoms of restless legs     On todays [place] Sleepiness Scale the patient scores a 14/24.       FAMILY HISTORY: No known sleep disorders.   SOCIAL HISTORY: . 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, mental health consultant    /74 (BP Location: Left arm, Patient Position: Sitting, BP Method: Small (Automatic))   Pulse 96   Ht 5' 1" (1.549 m)   Wt 69.8 kg (153 lb 14.1 oz)   BMI 29.08 kg/m²     ASSESSMENT:   Unspecified Sleep Apnea, with symptoms of questionable snoring,ongoing un-refreshing disrupted sleep and excessive daytime sleepiness,with medical comorbidities of migraines, myalgia.  Warrants further investigation for untreated sleep apnea.   Hypersomnia unspecified, suspicion central etiology    PLAN:   1. Polysomnogram , discussed plan of care   2. Discussed etiology of MARIANELA  3. Abstain from driving should he feel sleepy or drowsy.   4. See neuro " as advised/continue meds      Thank you for allowing me the opportunity to participate in the care of your patient

## 2023-03-23 ENCOUNTER — TELEPHONE (OUTPATIENT)
Dept: SLEEP MEDICINE | Facility: OTHER | Age: 28
End: 2023-03-23
Payer: COMMERCIAL

## 2023-04-10 ENCOUNTER — OFFICE VISIT (OUTPATIENT)
Dept: RHEUMATOLOGY | Facility: CLINIC | Age: 28
End: 2023-04-10
Payer: COMMERCIAL

## 2023-04-10 VITALS
BODY MASS INDEX: 28.72 KG/M2 | DIASTOLIC BLOOD PRESSURE: 75 MMHG | SYSTOLIC BLOOD PRESSURE: 114 MMHG | WEIGHT: 152.13 LBS | HEIGHT: 61 IN | OXYGEN SATURATION: 98 % | HEART RATE: 96 BPM

## 2023-04-10 DIAGNOSIS — M35.7 BENIGN HYPERMOBILITY SYNDROME: ICD-10-CM

## 2023-04-10 DIAGNOSIS — Z71.89 COUNSELING AND COORDINATION OF CARE: ICD-10-CM

## 2023-04-10 DIAGNOSIS — R76.8 POSITIVE ANA (ANTINUCLEAR ANTIBODY): Primary | ICD-10-CM

## 2023-04-10 PROCEDURE — 3008F PR BODY MASS INDEX (BMI) DOCUMENTED: ICD-10-PCS | Mod: CPTII,S$GLB,, | Performed by: INTERNAL MEDICINE

## 2023-04-10 PROCEDURE — 1159F PR MEDICATION LIST DOCUMENTED IN MEDICAL RECORD: ICD-10-PCS | Mod: CPTII,S$GLB,, | Performed by: INTERNAL MEDICINE

## 2023-04-10 PROCEDURE — 3078F PR MOST RECENT DIASTOLIC BLOOD PRESSURE < 80 MM HG: ICD-10-PCS | Mod: CPTII,S$GLB,, | Performed by: INTERNAL MEDICINE

## 2023-04-10 PROCEDURE — 99204 PR OFFICE/OUTPT VISIT, NEW, LEVL IV, 45-59 MIN: ICD-10-PCS | Mod: 25,S$GLB,, | Performed by: INTERNAL MEDICINE

## 2023-04-10 PROCEDURE — 3074F SYST BP LT 130 MM HG: CPT | Mod: CPTII,S$GLB,, | Performed by: INTERNAL MEDICINE

## 2023-04-10 PROCEDURE — 99999 PR PBB SHADOW E&M-EST. PATIENT-LVL IV: ICD-10-PCS | Mod: PBBFAC,,, | Performed by: INTERNAL MEDICINE

## 2023-04-10 PROCEDURE — 1159F MED LIST DOCD IN RCRD: CPT | Mod: CPTII,S$GLB,, | Performed by: INTERNAL MEDICINE

## 2023-04-10 PROCEDURE — 3078F DIAST BP <80 MM HG: CPT | Mod: CPTII,S$GLB,, | Performed by: INTERNAL MEDICINE

## 2023-04-10 PROCEDURE — 3008F BODY MASS INDEX DOCD: CPT | Mod: CPTII,S$GLB,, | Performed by: INTERNAL MEDICINE

## 2023-04-10 PROCEDURE — 99999 PR PBB SHADOW E&M-EST. PATIENT-LVL IV: CPT | Mod: PBBFAC,,, | Performed by: INTERNAL MEDICINE

## 2023-04-10 PROCEDURE — 99204 OFFICE O/P NEW MOD 45 MIN: CPT | Mod: 25,S$GLB,, | Performed by: INTERNAL MEDICINE

## 2023-04-10 PROCEDURE — 96372 PR INJECTION,THERAP/PROPH/DIAG2ST, IM OR SUBCUT: ICD-10-PCS | Mod: S$GLB,,, | Performed by: INTERNAL MEDICINE

## 2023-04-10 PROCEDURE — 3074F PR MOST RECENT SYSTOLIC BLOOD PRESSURE < 130 MM HG: ICD-10-PCS | Mod: CPTII,S$GLB,, | Performed by: INTERNAL MEDICINE

## 2023-04-10 PROCEDURE — 96372 THER/PROPH/DIAG INJ SC/IM: CPT | Mod: S$GLB,,, | Performed by: INTERNAL MEDICINE

## 2023-04-10 RX ORDER — CYANOCOBALAMIN 1000 UG/ML
1000 INJECTION, SOLUTION INTRAMUSCULAR; SUBCUTANEOUS
Status: COMPLETED | OUTPATIENT
Start: 2023-04-10 | End: 2023-04-10

## 2023-04-10 RX ADMIN — CYANOCOBALAMIN 1000 MCG: 1000 INJECTION, SOLUTION INTRAMUSCULAR; SUBCUTANEOUS at 03:04

## 2023-04-10 NOTE — PROGRESS NOTES
RHEUMATOLOGY OUTPATIENT CLINIC NOTE    4/10/2023    Attending Rheumatologist: Demar Monahan  Primary Care Provider: Domitila Ceballos DO   Physician Requesting Consultation: Domitila Ceballos DO  [id number] [facility] [place] [address]  Chief Complaint/Reason For Consultation:  abnormal blood test      Subjective:       HPI  Poornima Anaya is a 28 y.o. White female with medical history noted below who presents for evaluation of +MICHAEL.     Patient presents for evaluation of +MICHAEL, checked in the setting of joint pain. She notes b/l wrist pain, progressing over the last year. Notes she is not able to do planks any longer. Also notes tightness of her hands, and neck pain and stiffness. Occasional lower back pain, no night time awakenings. Repetitive or heavy weight worsens pain, stretching helps. Notes NSAIDs help. Massage therapy for neck. No swelling, no morning stiffness. She feels as the end of the day is worse. Sleepy, fatigue. Aunt with OA/FM, otherwise no CTD. GM with Thyroid CA. Does report fracture as a child of ankles and wrists. Occasional paraesthesias of hands, brain fog/forgetful, GERD, IUD (no menses). No Alopecia, Oral/Nasal Ulcers, Rash, Photosensitivity, Dry Eyes, Dry Mouth, Pleuritis/serositis, Easy Bruising, LAD, Raynaud's, Miscarriages/Blood clots, Skin tightening, SOB, chest pain, fever, wt loss, diarrhea/constipation.     Review of Systems   Constitutional:  Positive for fatigue. Negative for chills, fever and unexpected weight change.   HENT:  Negative for mouth sores.    Eyes:  Negative for redness and eye dryness.   Respiratory:  Negative for cough and shortness of breath.    Cardiovascular:  Negative for chest pain.   Gastrointestinal:  Negative for abdominal distention, constipation, diarrhea, nausea and vomiting.   Genitourinary:  Negative for vaginal dryness.   Musculoskeletal:  Positive for arthralgias, back pain, myalgias and neck pain.  Negative for gait problem, joint swelling, leg pain, neck stiffness and joint deformity.   Integumentary:  Negative for rash.   Neurological:  Positive for numbness and headaches. Negative for weakness.   Hematological:  Negative for adenopathy. Does not bruise/bleed easily.   Psychiatric/Behavioral:  Negative for confusion, decreased concentration and sleep disturbance. The patient is not nervous/anxious.    All other systems reviewed and are negative.     Chronic comorbid conditions affecting medical decision making today:  Past Medical History:   Diagnosis Date    Allergy     Migraines     Recurrent upper respiratory infection (URI)     Urticaria      Past Surgical History:   Procedure Laterality Date    TONSILLECTOMY Bilateral     TYMPANOSTOMY TUBE PLACEMENT       Family History   Problem Relation Age of Onset    Allergies Mother     Heart disease Mother     Migraines Mother     Cancer Father 60        Bladder cancer    Allergies Sister     Migraines Sister     Allergies Brother     Migraines Brother     Breast cancer Maternal Grandmother     Thyroid cancer Maternal Grandmother     Stroke Maternal Grandmother     Atrial fibrillation Maternal Grandmother     Lung cancer Maternal Grandfather     Stroke Paternal Grandmother     No Known Problems Paternal Grandfather      Social History     Substance and Sexual Activity   Alcohol Use Yes    Alcohol/week: 2.0 standard drinks    Types: 2 Glasses of wine per week    Comment: weekend occasional     Social History     Tobacco Use   Smoking Status Never    Passive exposure: Never   Smokeless Tobacco Never     Social History     Substance and Sexual Activity   Drug Use Never       Current Outpatient Medications:     cetirizine (ZYRTEC) 10 mg Cap, , Disp: , Rfl:     erenumab-aooe (AIMOVIG AUTOINJECTOR) 70 mg/mL autoinjector, Inject 1 mL (70 mg total) into the skin every 28 days., Disp: 1 each, Rfl: 11    fluticasone propionate (FLONASE) 50 mcg/actuation nasal spray, 2  sprays (100 mcg total) by Each Nostril route 2 (two) times daily., Disp: 31.6 mL, Rfl: 5    pseudoephedrine (SUDAFED) 120 mg 12 hr tablet, Take 120 mg by mouth every 12 (twelve) hours as needed., Disp: , Rfl:     sumatriptan (IMITREX) 100 MG tablet, Take 1 tablet (100 mg total) by mouth 2 (two) times daily as needed for Migraine., Disp: 12 tablet, Rfl: 11    methylPREDNISolone (MEDROL DOSEPACK) 4 mg tablet, use as directed (Patient not taking: Reported on 4/10/2023), Disp: 21 each, Rfl: 0  No current facility-administered medications for this visit.     Objective:         Vitals:    04/10/23 1504   BP: 114/75   Pulse: 96     Physical Exam  Can make fist, no synovitis  Wrist, Elbows, Shoulder, Hip ROM ok (hypermobile)  Knee ok  Negative ankle/MTP  Tender Points:  No   Yes  [x]    []   Low cervical anterior aspect    [x]    []   Costochondral Junction   [x]    []   Lateral Epicondyle  []    [x]   Suboccipital   []    [x]   Trapezius   []    [x]   Supraspinatus   [x]    []   Gluteal   []    [x]   Greater trochanter  [x]    []   Knee    Beighton Score 9/9   Reviewed old and all outside pertinent medical records available.    All lab results personally reviewed and interpreted by me.  Lab Results   Component Value Date    WBC 6.05 11/09/2022    HGB 14.1 11/09/2022    HCT 42.7 11/09/2022    MCV 93 11/09/2022    MCH 30.8 11/09/2022    MCHC 33.0 11/09/2022    RDW 12.5 11/09/2022     11/09/2022    MPV 9.7 11/09/2022       Lab Results   Component Value Date     11/09/2022    K 4.5 11/09/2022     11/09/2022    CO2 25 11/09/2022    GLU 90 11/09/2022    BUN 13 11/09/2022    CALCIUM 9.6 11/09/2022    PROT 7.1 11/09/2022    ALBUMIN 4.4 11/09/2022    BILITOT 0.7 11/09/2022    AST 18 11/09/2022    ALKPHOS 44 (L) 11/09/2022    ALT 12 11/09/2022       No results found for: COLORU, APPEARANCEUA, SPECGRAV, PHUR, PHUA, PROTEINUA, GLUCOSEU, KETONESU, BLOODU, LEUKOCYTESUR, NITRITE, UROBILINOGEN    Lab Results   Component  Value Date    CRP 0.9 12/30/2022       Lab Results   Component Value Date    SEDRATE 3 12/30/2022       Lab Results   Component Value Date    RF <13.0 12/30/2022    SEDRATE 3 12/30/2022       No components found for: 25OHVITDTOT, 64FLYOMS9, 95EPPTAC8, METHODNOTE    No results found for: URICACID    No components found for: TSPOTTB        Imaging:  All imaging reviewed and independently interpreted by me.         ASSESSMENT / PLAN:     Poornima Anaya is a 28 y.o. White female with:      1. Positive MICHAEL (antinuclear antibody)  - MICHAEL 1:160, Speckled/Homogenous, Profile Negative  - discussed results  - she does not have s/s consistent with CTD at this moment  - reassurance   - Ambulatory referral/consult to Rheumatology    2. Benign hypermobility syndrome  - she has 9/9 Beighton Score and Hypermobility  - discussed diagnosis and management  - joint stability/protection discussed  - IM B12  - Reassurance and exercise   - Ambulatory referral/consult to Rheumatology  - cyanocobalamin injection 1,000 mcg  Procedure Note  Indication: Benign Hypermobility Syndrome   The risks, benefits and alternatives of this procedure were discussed with patient. Verbal consent was obtained.   The site was identified as the Right Buttocks cleaned with chlorhexidine.   The site was injected with 1 mL of B12  The patient tolerated the procedure well. Adverse effects: none  The patient reports improvement in symptoms.   Post injection instructions were given and questions were answered.       3. Other specified counseling  - over 10 minutes spent regarding below topics:  - Immunization counseling done.  - Weight loss counseling done.  - Nutrition and exercise counseling.  - Limitation of alcohol consumption.  - Regular exercise:  Aerobic and resistance.  - Medication counseling provided.      Follow up in about 6 months (around 10/10/2023).    Method of contact with patient concerns: [facility]    Disclaimer:  This note is  prepared using voice recognition software and as such is likely to have errors and has not been proof read. Please contact me for questions.     Time spent: 45 minutes in face to face discussion concerning diagnosis, prognosis, review of lab and test results, benefits of treatment as well as management of disease, counseling of patient and coordination of care between various health care providers.  Greater than half the time spent was used for coordination of care and counseling of patient.    Demar Monahan M.D.  Rheumatology Department   [facility]

## 2023-04-17 PROBLEM — J01.00 ACUTE NON-RECURRENT MAXILLARY SINUSITIS: Status: RESOLVED | Noted: 2023-01-10 | Resolved: 2023-04-17

## 2023-05-17 ENCOUNTER — PATIENT MESSAGE (OUTPATIENT)
Dept: RHEUMATOLOGY | Facility: CLINIC | Age: 28
End: 2023-05-17
Payer: COMMERCIAL

## 2023-07-03 PROBLEM — M35.7 BENIGN HYPERMOBILITY SYNDROME: Status: ACTIVE | Noted: 2023-07-03

## 2023-07-03 NOTE — PROGRESS NOTES
FAMILY MEDICINE  [facility]    Reason for visit:   Chief Complaint   Patient presents with    Follow-up    ADHD    Fatigue    Joint Pain         SUBJECTIVE: Poornima Anaya is a 28 y.o. female  - with chronic migraines presents follow-up muscle aches, joint pains, sleepiness, fatigue, allergies and concerns for ADHD    Gynecology: Dr. Yulisa Richter MD  Neurology:Marily Zhang NP  Allergist Dr. Leanne Thakur MD  - evaluated 2/7/23  Sleep Medicine: Tsering Davis NP  - evaluated 3/15/23  Rheumatology Dr. Demar Monahan MD  - evaluated 4/10/23    Since her last visit Poornima Anaya has been evaluated by an allergist, Sleep Medicine and Rheumatology.  I reviewed their evaluation.  She had allergy testing for chronic rhinitis.  She was instructed to continue Flonase to stop her antihistamines prior to the testing.  She is also scheduled for a sleep study by sleep medicine for excessive daytime sleepiness however the cost was too high.  Rheumatology reviewed her lab work including her elevated MICHAEL.  She is diagnosed with benign hypermobility syndrome.     She was referred to PT 12/29/2022 however cost was an issue    She is feeling better since using her flonase more consistently and decreased Sudafed use to rare. She reports that she feeling better. Fatigue has improved.  She did see Sleep Medicine for her excessive daytime sleepiness.  Sleep medicine noted that she did not suspect obstructive sleep apnea but has concerns for the excessive daytime sleepiness secondary to the question year.  Patient reports that she thought the questionnaire was related or her a symptoms her entire life and had correlated with the time she was more sleepy while she was on propanolol for her migraines.  However she repeated the questionnaire with symptoms over the last 2 weeks she reports that was much better in did not think crit here for excessive daytime sleepiness related with narcolepsy.  She  "opted not to do the sleep study secondary to cost.    1. Attention deficit disorder without hyperactivity    Today 7/6/23: Recently she has more concerns regarding her concentration, jumping from tasks and difficulty completing task. Works remotely and reports that at time she will be late because she cannot get ready in time.  She has had issues in the past and was diagnosed with ADHD in high school.  She did take ADHD medication through high school as well as college some in graduate school however and worsened her migraines and she did not want to continue the medication.  She has recently been thinking about starting Wellbutrin for management since she is having more difficulty.  She reports that she is often sleepy when she is doing something "boring".  She reports that she is more active and interested in something she seems to be able to stay awake and maintain somewhat focus however it is becoming increasingly difficult.    Daily Activity:  Works from home    Age diagnosed: high school   Diagnosed by: Pediatrician   Initial evaluation present in chart: No    Past medications: Focalin high school though college and graduate school but had migraines   Reasons for changing past medications:  Headaches    Current medications:   None     reviewed: last filled NA    Prior note 12/29/22:   "1. Muscle aches, fatigue and joint pains     She reports that has been going on for several years.  Initially it started with her wrist.  She reports that she is typing a lot work in her wrist were constantly sore.  Recently that has improved since she has not been doing desk work frequently.  Then she reports she also has ankle pain as well as with pain and chronic neck pain without any radiation or decreased strength or numbness and tingling of her arms she was evaluated by her prior PCP in she reports that initially her sed rate was elevated however repeat testing was normal.  She also reports that her MICHAEL and rheumatoid " "testing at that time were normal.  She reports that she has had an elevated cortisol level would like that repeated as well.    She has found that she is more fatigued than she would expect for her age.  She tries to exercise regularly.  She reports that her joint pains worsen when she was doing more strenuous workout with high intensity interval training.  She currently is doing Pilates reports that she feels much better doing this type of exercise.  She does feel chronically tired throughout the day.  She has also gained 20 lb in the last 2 years    2. Excessive daytime sleepiness   She reports that this is also been a chronic issue.  She make sure that she gets 8 hours of sleep every night.  If she gets any less then she will be very tired.  However even with 8 hours of sleep she reports that after lunch she often has to take a nap.  She is currently back in the office working.  She works a half day and returns home after lunch.  She reports often she will have to take a nap.  She has not had a sleep evaluation.    She does suffer from chronic allergies.      3. Chronic allergies   She reports that while living in [facility] she was evaluated by an allergist.  She was started on allergy injections which seemed to be helpful but had difficulty with her insurance prior to moving to [place].  She reports that she take Zyrtec 10 mg nightly over-the-counter as well as occasional Benadryl.  She does not feel that montelukast has been effective for her in the past.  She also takes Sudafed about 4-5 days per week.  Her last dose of Sudafed was yesterday morning she takes this 12 hour Sudafed.  She has also done this for at least the last year."      Review of Systems   HENT:  Negative for hearing loss.    Eyes:  Negative for discharge.   Respiratory:  Negative for wheezing.    Cardiovascular:  Negative for chest pain and palpitations.   Gastrointestinal:  Negative for blood in stool, constipation, diarrhea and " vomiting.   Genitourinary:  Negative for dysuria and hematuria.   Musculoskeletal:  Positive for neck pain.   Neurological:  Positive for headaches. Negative for weakness.   Endo/Heme/Allergies:  Negative for polydipsia.   All other systems reviewed and are negative.    HEALTH MAINTENANCE:   Health Maintenance   Topic Date Due    Pap Smear  11/07/2025    TETANUS VACCINE  12/29/2032    Hepatitis C Screening  Completed    Lipid Panel  Completed     Health Maintenance Topics with due status: Not Due       Topic Last Completion Date    Pap Smear 11/07/2022    TETANUS VACCINE 12/29/2022    Influenza Vaccine 12/29/2022     There are no preventive care reminders to display for this patient.      HISTORY:   Past Medical History:   Diagnosis Date    Allergy     Elevated cortisol level 12/29/2022    - she reports a history of a high cortisol level recommended repeat 8 a.m. cortisol which was normal    Migraines     Recurrent upper respiratory infection (URI)     Urticaria        Past Surgical History:   Procedure Laterality Date    ADENOIDECTOMY  12/2016    TONSILLECTOMY Bilateral     TYMPANOSTOMY TUBE PLACEMENT         Family History   Problem Relation Age of Onset    Allergies Mother     Heart disease Mother     Migraines Mother     Cancer Father 60        Bladder cancer    Mental illness Father         ADHD and anxiety    Allergies Sister     Migraines Sister     Allergies Brother     Migraines Brother     Breast cancer Maternal Grandmother     Thyroid cancer Maternal Grandmother     Stroke Maternal Grandmother     Atrial fibrillation Maternal Grandmother     Lung cancer Maternal Grandfather     Stroke Paternal Grandmother     No Known Problems Paternal Grandfather        Social History     Tobacco Use    Smoking status: Never     Passive exposure: Never    Smokeless tobacco: Never   Substance Use Topics    Alcohol use: Yes     Alcohol/week: 2.0 standard drinks     Types: 2 Glasses of wine per week     Comment: weekend  occasional    Drug use: Never       Social History     Social History Narrative    . No children. 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 Grew up in the [facility]. Parents now live in [place], [place]. She moved back to [facility] 6/2022       ALLERGIES:   Review of patient's allergies indicates:   Allergen Reactions    Penicillins Hives       MEDS:     Current Outpatient Medications:     cetirizine (ZYRTEC) 10 mg Cap, , Disp: , Rfl:     erenumab-aooe (AIMOVIG AUTOINJECTOR) 70 mg/mL autoinjector, Inject 1 mL (70 mg total) into the skin every 28 days., Disp: 1 each, Rfl: 11    fluticasone propionate (FLONASE) 50 mcg/actuation nasal spray, 2 sprays (100 mcg total) by Each Nostril route 2 (two) times daily., Disp: 31.6 mL, Rfl: 5    pseudoephedrine (SUDAFED) 120 mg 12 hr tablet, Take 120 mg by mouth every 12 (twelve) hours as needed., Disp: , Rfl:     sumatriptan (IMITREX) 100 MG tablet, Take 1 tablet (100 mg total) by mouth 2 (two) times daily as needed for Migraine., Disp: 12 tablet, Rfl: 11    buPROPion (WELLBUTRIN XL) 150 MG TB24 tablet, Take 1 tablet (150 mg total) by mouth once daily., Disp: 90 tablet, Rfl: 0      Vital signs:   Vitals:    07/06/23 0800   Weight: 68.9 kg (152 lb)       Body mass index is 28.72 kg/m².  PHYSICAL EXAM:     Physical Exam  Constitutional:       General: She is not in acute distress.  Pulmonary:      Effort: Pulmonary effort is normal. No respiratory distress.   Neurological:      Mental Status: She is alert.   Psychiatric:         Speech: Speech normal.         PHQ4 = No data recorded    PERTINENT RESULTS:   No visits with results within 1 Week(s) from this visit.   Latest known visit with results is:   Lab Visit on 02/07/2023   Component Date Value Ref Range Status    IgE 02/07/2023 <35  0 - 100 IU/mL Final    D. farinae 02/07/2023 2.12 (H)  <0.10 kU/L Final    D. farinae Class 02/07/2023 CLASS 2   Final    Comment: Test performed at [facility],  [address], [place]  [address]     [phone]  Margarita Quinones MD, PhD - Medical Director      Mite Dust Pteronyssinus IgE 02/07/2023 1.56 (H)  <0.10 kU/L Final    D. pteronyssinus Class 02/07/2023 CLASS 2   Final    Comment: Test performed at [facility],  [address] [place]  [address]     [phone]  Margarita Quinones MD, PhD - Medical Director      Bermuda Grass 02/07/2023 0.94 (H)  <0.10 kU/L Final    Bermuda Grass Class 02/07/2023 CLASS 2   Final    Comment: Test performed at [facility],  [address] [place]  [address]     [phone]  Margarita Quinones MD, PhD - Medical Director      Josh Grass 02/07/2023 0.60 (H)  <0.10 kU/L Final    Josh Grass Class 02/07/2023 CLASS 1   Final    Comment: Test performed at [facility],  [address], [place]  [address]     [phone]  Margarita Quinones MD, PhD - Medical Director      Cass IgE 02/07/2023 <0.10  <0.10 kU/L Final    Cass Class 02/07/2023 CLASS 0   Final    Comment: Test performed at [facility],  [address] [place]  [address]     [phone]  Margarita Quinones MD, PhD - Medical Director      Plantain 02/07/2023 <0.10  <0.10 kU/L Final    English Plantain Class 02/07/2023 CLASS 0   Final    Comment: Test performed at [facility],  [address] [place]  [address]     [phone]  Margarita Quinones MD, PhD - Medical Director      [place](Quercus alba) IgE 02/07/2023 0.27 (H)  <0.10 kU/L Final    [place], Class 02/07/2023 CLASS 0/1   Final    Comment: Test performed at [facility],  [address] [place]  [address]     [phone]  Margarita Quinones MD, PhD - Medical Director      Lluvia Hinojosa 02/07/2023 0.12 (H)  <0.10 kU/L Final    Pecan, Class 02/07/2023 CLASS 0/1   Final    Comment: Test performed at [facility],  [address], [place]  [address]     [phone]  Margarita Quinones MD, PhD - Medical  Director      Ragweed, Western IgE 02/07/2023 0.16 (H)  <0.10 kU/L Final    Ragweed, Western, Class 02/07/2023 CLASS 0/1   Final    Comment: Test performed at [facility],  [address] [place]  [address]     [phone]  Margarita Quinones MD, PhD - Medical Director      Alternaria alternata 02/07/2023 <0.10  <0.10 kU/L Final    Altern. alternata Class 02/07/2023 CLASS 0   Final    Comment: Test performed at [facility],  [address] [place]  [address]     [phone]  Margarita Quinones MD, PhD - Medical Director      Aspergillus Fumigatus IgE 02/07/2023 <0.10  <0.10 kU/L Final    A. fumigatus Class 02/07/2023 CLASS 0   Final    Comment: Test performed at [facility],  [address] [place]  [address]     [phone]  Margarita Quinones MD, PhD - Medical Director      Cat Dander 02/07/2023 <0.10  <0.10 kU/L Final    Cat Epithelium Class 02/07/2023 CLASS 0   Final    Comment: Test performed at [facility],  [address] [place]  [address]     [phone]  Margarita Quinones MD, PhD - Medical Director      Cockroach, IgE 02/07/2023 <0.10  <0.10 kU/L Final    Cockroach, IgE 02/07/2023 CLASS 0   Final    Comment: Test performed at [facility],  [address] [place], [place]  [id number] [phone]  Margarita Quinones MD, PhD - Medical Director      Dog Dander, IgE 02/07/2023 <0.10  <0.10 kU/L Final    Dog Dander Class 02/07/2023 CLASS 0   Final    Comment: Test performed at [facility],  [address] [place], [place]  [id number] [phone]  Margarita Quinones MD, PhD - Medical Director       Lab Results   Component Value Date    WBC 6.05 11/09/2022    HGB 14.1 11/09/2022    HCT 42.7 11/09/2022    MCV 93 11/09/2022     11/09/2022       Lab Results   Component Value Date    TSH 1.101 11/09/2022     CMP  Sodium   Date Value Ref Range Status   11/09/2022 140 136 - 145 mmol/L Final     Potassium   Date  Value Ref Range Status   11/09/2022 4.5 3.5 - 5.1 mmol/L Final     Chloride   Date Value Ref Range Status   11/09/2022 107 95 - 110 mmol/L Final     CO2   Date Value Ref Range Status   11/09/2022 25 23 - 29 mmol/L Final     Glucose   Date Value Ref Range Status   11/09/2022 90 70 - 110 mg/dL Final     BUN   Date Value Ref Range Status   11/09/2022 13 6 - 20 mg/dL Final     Creatinine   Date Value Ref Range Status   11/09/2022 0.9 0.5 - 1.4 mg/dL Final     Calcium   Date Value Ref Range Status   11/09/2022 9.6 8.7 - 10.5 mg/dL Final     Total Protein   Date Value Ref Range Status   11/09/2022 7.1 6.0 - 8.4 g/dL Final     Albumin   Date Value Ref Range Status   11/09/2022 4.4 3.5 - 5.2 g/dL Final     Total Bilirubin   Date Value Ref Range Status   11/09/2022 0.7 0.1 - 1.0 mg/dL Final     Comment:     For infants and newborns, interpretation of results should be based  on gestational age, weight and in agreement with clinical  observations.    Premature Infant recommended reference ranges:  Up to 24 hours.............<8.0 mg/dL  Up to 48 hours............<12.0 mg/dL  3-5 days..................<15.0 mg/dL  6-29 days.................<15.0 mg/dL       Alkaline Phosphatase   Date Value Ref Range Status   11/09/2022 44 (L) 55 - 135 U/L Final     AST   Date Value Ref Range Status   11/09/2022 18 10 - 40 U/L Final     ALT   Date Value Ref Range Status   11/09/2022 12 10 - 44 U/L Final     Anion Gap   Date Value Ref Range Status   11/09/2022 8 8 - 16 mmol/L Final     eGFR   Date Value Ref Range Status   11/09/2022 >60 >60 mL/min/1.73 m^2 Final         ASSESSMENT/PLAN:  1. Attention deficit hyperactivity disorder (ADHD), predominantly inattentive type  Overview:  - diagnosed in high school   - stimulatants worsened her migraines and she is interested in trialing Wellbutrin   -recommend start Wellbutrin  mg daily  - counseling regarding new medication including expected results, potential side effects, and appropriate  use.  - questions elicited and answered  - encouraged to patient to notify me of any questions or concerns    Orders:  -     Discontinue: buPROPion (WELLBUTRIN XL) 150 MG TB24 tablet; Take 1 tablet (150 mg total) by mouth once daily.  Dispense: 30 tablet; Refill: 1  -     buPROPion (WELLBUTRIN XL) 150 MG TB24 tablet; Take 1 tablet (150 mg total) by mouth once daily.  Dispense: 90 tablet; Refill: 0    2. Benign hypermobility syndrome  Overview:  - evaluated by Rheumatology 4/10/23 who noted 9/9/ Beighton Score and hypermobility  - recommend joint stability exercises and instructed on resources    Orders:  -     Vitamin B12; Future; Expected date: 07/06/2023    3. Elevated antinuclear antibody (MICHAEL) level  Overview:  - MICHAEL 1:160, speckled/homogeneous, final negative  - not consistent with connective tissue disease and evaluated by rheumatology 04/2023      4. Low vitamin B12 level  Overview:  - B12 level low normal.  She was given a B12 injection by the rheumatologist   -recommend start methylated B12 1000 mcg daily   -recommend repeat B12 in 1 month    Orders:  -     Vitamin B12; Future; Expected date: 07/06/2023    5. Chronic rhinitis  Overview:  - concerned that her allergic rhinitis is affecting her sleep and chronic fatigue   - following with allergist  - symptoms have improved              ORDERS:   Orders Placed This Encounter    Vitamin B12    buPROPion (WELLBUTRIN XL) 150 MG TB24 tablet         Vaccines recommended:  up to date    Follow-up in 1 month with B12 level or sooner if any concerns.      This note is dictated using the M*Modal Fluency Direct word recognition program. There are word recognition mistakes that are occasionally missed on review.    Dr. Domitila Ceballos D.O.   [facility]

## 2023-07-06 ENCOUNTER — OFFICE VISIT (OUTPATIENT)
Dept: PRIMARY CARE CLINIC | Facility: CLINIC | Age: 28
End: 2023-07-06
Attending: FAMILY MEDICINE
Payer: COMMERCIAL

## 2023-07-06 VITALS — BODY MASS INDEX: 28.72 KG/M2 | WEIGHT: 152 LBS

## 2023-07-06 DIAGNOSIS — J31.0 CHRONIC RHINITIS: ICD-10-CM

## 2023-07-06 DIAGNOSIS — M35.7 BENIGN HYPERMOBILITY SYNDROME: ICD-10-CM

## 2023-07-06 DIAGNOSIS — R79.89 LOW VITAMIN B12 LEVEL: ICD-10-CM

## 2023-07-06 DIAGNOSIS — R76.8 ELEVATED ANTINUCLEAR ANTIBODY (ANA) LEVEL: ICD-10-CM

## 2023-07-06 DIAGNOSIS — F90.0 ATTENTION DEFICIT HYPERACTIVITY DISORDER (ADHD), PREDOMINANTLY INATTENTIVE TYPE: Primary | ICD-10-CM

## 2023-07-06 PROBLEM — R00.0 TACHYCARDIA: Status: RESOLVED | Noted: 2022-12-29 | Resolved: 2023-07-06

## 2023-07-06 PROCEDURE — 1159F PR MEDICATION LIST DOCUMENTED IN MEDICAL RECORD: ICD-10-PCS | Mod: CPTII,95,, | Performed by: FAMILY MEDICINE

## 2023-07-06 PROCEDURE — 1160F RVW MEDS BY RX/DR IN RCRD: CPT | Mod: CPTII,95,, | Performed by: FAMILY MEDICINE

## 2023-07-06 PROCEDURE — 1160F PR REVIEW ALL MEDS BY PRESCRIBER/CLIN PHARMACIST DOCUMENTED: ICD-10-PCS | Mod: CPTII,95,, | Performed by: FAMILY MEDICINE

## 2023-07-06 PROCEDURE — 99214 OFFICE O/P EST MOD 30 MIN: CPT | Mod: 95,,, | Performed by: FAMILY MEDICINE

## 2023-07-06 PROCEDURE — 3008F BODY MASS INDEX DOCD: CPT | Mod: CPTII,95,, | Performed by: FAMILY MEDICINE

## 2023-07-06 PROCEDURE — 3008F PR BODY MASS INDEX (BMI) DOCUMENTED: ICD-10-PCS | Mod: CPTII,95,, | Performed by: FAMILY MEDICINE

## 2023-07-06 PROCEDURE — 1159F MED LIST DOCD IN RCRD: CPT | Mod: CPTII,95,, | Performed by: FAMILY MEDICINE

## 2023-07-06 PROCEDURE — 99214 PR OFFICE/OUTPT VISIT, EST, LEVL IV, 30-39 MIN: ICD-10-PCS | Mod: 95,,, | Performed by: FAMILY MEDICINE

## 2023-07-06 RX ORDER — BUPROPION HYDROCHLORIDE 150 MG/1
150 TABLET ORAL DAILY
Qty: 90 TABLET | Refills: 0 | Status: SHIPPED | OUTPATIENT
Start: 2023-07-06 | End: 2023-08-21 | Stop reason: SDUPTHER

## 2023-07-06 RX ORDER — BUPROPION HYDROCHLORIDE 150 MG/1
150 TABLET ORAL DAILY
Qty: 30 TABLET | Refills: 1 | Status: SHIPPED | OUTPATIENT
Start: 2023-07-06 | End: 2023-07-06

## 2023-07-06 NOTE — Clinical Note
Scheduled follow-up Virtual anytime slot ADHD after 8/16/23 with B12 level prior to visit. She and I are out of town the first 2 weeks of August

## 2023-08-17 NOTE — PROGRESS NOTES
VIRTUAL/TELEMEDICINE VISIT   FAMILY MEDICINE  [facility]    The patient location is: [place]  The chief complaint leading to consultation is:   Chief Complaint   Patient presents with    ADHD    Follow-up     B12     Visit type: Virtual visit with synchronous audio and video   Total time spent: 30 minute  Each patient to whom he or she provides medical services by telemedicine is:  (1) informed of the relationship between the physician and patient and the respective role of any other health care provider with respect to management of the patient; and (2) notified that he or she may decline to receive medical services by telemedicine and may withdraw from such care at any time.    Reason for visit:   Chief Complaint   Patient presents with    ADHD    Follow-up     B12       SUBJECTIVE: Poornima Anaya is a 28 y.o. female  - with chronic migraines presents follow-up ADHD and B12    Gynecology: Dr. Yulisa Richter MD  Neurology:Marily Zhang NP  Allergist Dr. Leanne Thakur MD  - evaluated 2/7/23  Sleep Medicine: Tsering Davis NP  - evaluated 3/15/23  Rheumatology Dr. Demar Monahan MD  - evaluated 4/10/23    Poornima Anaya reports that she is doing well.  She has returned from her honeymoon daily.  They had great time.  Since her last visit she started med bleed B12 1000 mcg daily.  She did have a repeat B12 and B12 improved 230 to 1043 pg/mL and she does reports improved energy.    1. Attention deficit disorder without hyperactivity    Today 8/21/2023: Poornima Anaya also starting Wellbutrin  mg daily for ADHD since her last visit.  She only worked for about 2 weeks after starting medication and then she has been transition from her elevation to new job.  She also time time offer her honeymoon.  She starts her new job next week.  She does feel that the 2nd week of on the medication she was more organized at her work.  She was able to get through the day better.  She also  "reports during their trip she organized all the activities which is typically difficult for her to do.  She did well on Wellbutrin was able to organize all the activities.  She feels good on the medication.  She is slightly concerned about how little work with her new job.  She would like to discuss if she can increase the medication if she finds that is not effective since she will have to focus much more and noted to be less flexibility at this job since her prior job she was working from home    Prior note  7/6/23: Recently she has more concerns regarding her concentration, jumping from tasks and difficulty completing task. Works remotely and reports that at time she will be late because she cannot get ready in time.  She has had issues in the past and was diagnosed with ADHD in high school.  She did take ADHD medication through high school as well as college some in graduate school however and worsened her migraines and she did not want to continue the medication.  She has recently been thinking about starting Wellbutrin for management since she is having more difficulty.  She reports that she is often sleepy when she is doing something "boring".  She reports that she is more active and interested in something she seems to be able to stay awake and maintain somewhat focus however it is becoming increasingly difficult.    Daily Activity:  Works from home    Age diagnosed: high school   Diagnosed by: Pediatrician   Initial evaluation present in chart: No    Past medications: Focalin high school though college and graduate school but had migraines   Reasons for changing past medications:  Headaches    Current medications:   buPROPion (WELLBUTRIN XL) 150 MG TB24 tablet, Take 1 tablet (150 mg total) by mouth once daily., Disp: 90 tablet, Rfl: 0     reviewed: last filled NA    Side effects: mild headache in AM that resolves and no worsening in her migraines              Review of Systems   HENT:  Negative for " hearing loss.    Eyes:  Negative for discharge.   Respiratory:  Negative for wheezing.    Cardiovascular:  Negative for chest pain and palpitations.   Gastrointestinal:  Negative for blood in stool, constipation, diarrhea and vomiting.   Genitourinary:  Negative for dysuria and hematuria.   Musculoskeletal:  Positive for neck pain.   Neurological:  Positive for headaches. Negative for weakness.   Endo/Heme/Allergies:  Negative for polydipsia.   All other systems reviewed and are negative.        HISTORY:   Past Medical History:   Diagnosis Date    Allergy     Elevated cortisol level 12/29/2022    - she reports a history of a high cortisol level recommended repeat 8 a.m. cortisol which was normal    Migraines     Recurrent upper respiratory infection (URI)     Urticaria        Past Surgical History:   Procedure Laterality Date    ADENOIDECTOMY  12/2016    TONSILLECTOMY Bilateral     TYMPANOSTOMY TUBE PLACEMENT         Family History   Problem Relation Age of Onset    Allergies Mother     Heart disease Mother     Migraines Mother     Cancer Father 60        Bladder cancer    Mental illness Father         ADHD and anxiety    Allergies Sister     Migraines Sister     Allergies Brother     Migraines Brother     Breast cancer Maternal Grandmother     Thyroid cancer Maternal Grandmother     Stroke Maternal Grandmother     Atrial fibrillation Maternal Grandmother     Lung cancer Maternal Grandfather     Stroke Paternal Grandmother     No Known Problems Paternal Grandfather        Social History     Tobacco Use    Smoking status: Never     Passive exposure: Never    Smokeless tobacco: Never   Substance Use Topics    Alcohol use: Yes     Alcohol/week: 2.0 standard drinks of alcohol     Types: 2 Glasses of wine per week     Comment: weekend occasional    Drug use: Never       Social History     Social History Narrative    . No children. 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 Grew up in the [facility] [place] [facility]. Parents now  live in [place], [place]. She moved back to [facility] 6/2022       ALLERGIES:   Review of patient's allergies indicates:   Allergen Reactions    Penicillins Hives       MEDS:   Current Outpatient Medications on File Prior to Visit   Medication Sig Dispense Refill Last Dose    cetirizine (ZYRTEC) 10 mg Cap        erenumab-aooe (AIMOVIG AUTOINJECTOR) 70 mg/mL autoinjector Inject 1 mL (70 mg total) into the skin every 28 days. 1 each 11     fluticasone propionate (FLONASE) 50 mcg/actuation nasal spray 2 sprays (100 mcg total) by Each Nostril route 2 (two) times daily. 31.6 mL 5     pseudoephedrine (SUDAFED) 120 mg 12 hr tablet Take 120 mg by mouth every 12 (twelve) hours as needed.       sumatriptan (IMITREX) 100 MG tablet Take 1 tablet (100 mg total) by mouth 2 (two) times daily as needed for Migraine. 12 tablet 11     [DISCONTINUED] buPROPion (WELLBUTRIN XL) 150 MG TB24 tablet Take 1 tablet (150 mg total) by mouth once daily. 90 tablet 0        Vital signs:   There were no vitals filed for this visit.  There is no height or weight on file to calculate BMI.    PHYSICAL EXAM:     Physical Exam  Constitutional:       General: She is not in acute distress.  Pulmonary:      Effort: Pulmonary effort is normal. No respiratory distress.   Neurological:      Mental Status: She is alert.   Psychiatric:         Mood and Affect: Mood and affect normal.         Speech: Speech normal.           PERTINENT RESULTS:   Lab Visit on 08/18/2023   Component Date Value Ref Range Status    Vitamin B-12 08/18/2023 1043 (H)  210 - 950 pg/mL Final       ASSESSMENT/PLAN:    1. Attention deficit hyperactivity disorder (ADHD), predominantly inattentive type  Overview:  - diagnosed in high school   - stimulatants worsened her migraines   - well controlled however she will be starting a new job soon.  I discussed that if she finds medications not effective after 3-4 weeks at her job she can increase her Wellbutrin  mg to 300 mg daily and to notify me  if she does this.  Also discussed that we can consider adding a low-dose stimulant in the afternoon however she will need appointment for this.  - at this time continue current management plan  - questions elicited and answered  - encouraged to patient to notify me of any questions or concerns    Orders:  -     buPROPion (WELLBUTRIN XL) 150 MG TB24 tablet; Take 1 tablet (150 mg total) by mouth once daily.  Dispense: 90 tablet; Refill: 3    2. Low vitamin B12 level  Overview:  - currently on methylated B12 1000 mcg daily   - B12 improved and recommend continue supplementation              ORDERS:   Orders Placed This Encounter    buPROPion (WELLBUTRIN XL) 150 MG TB24 tablet       Vaccines recommended: UTD    Follow-up in 6 months or sooner if any concerns.      This note is dictated using the M*Modal Fluency Direct word recognition program. There are word recognition mistakes that are occasionally missed on review.    Dr. Domitila Ceballos D.O.   [facility]

## 2023-08-18 ENCOUNTER — LAB VISIT (OUTPATIENT)
Dept: LAB | Facility: HOSPITAL | Age: 28
End: 2023-08-18
Attending: FAMILY MEDICINE
Payer: COMMERCIAL

## 2023-08-18 DIAGNOSIS — R79.89 LOW VITAMIN B12 LEVEL: ICD-10-CM

## 2023-08-18 DIAGNOSIS — M35.7 BENIGN HYPERMOBILITY SYNDROME: ICD-10-CM

## 2023-08-18 LAB — VIT B12 SERPL-MCNC: 1043 PG/ML (ref 210–950)

## 2023-08-18 PROCEDURE — 36415 COLL VENOUS BLD VENIPUNCTURE: CPT | Mod: PN | Performed by: FAMILY MEDICINE

## 2023-08-18 PROCEDURE — 82607 VITAMIN B-12: CPT | Performed by: FAMILY MEDICINE

## 2023-08-21 ENCOUNTER — OFFICE VISIT (OUTPATIENT)
Dept: PRIMARY CARE CLINIC | Facility: CLINIC | Age: 28
End: 2023-08-21
Attending: FAMILY MEDICINE
Payer: COMMERCIAL

## 2023-08-21 ENCOUNTER — TELEPHONE (OUTPATIENT)
Dept: PRIMARY CARE CLINIC | Facility: CLINIC | Age: 28
End: 2023-08-21

## 2023-08-21 DIAGNOSIS — R79.89 LOW VITAMIN B12 LEVEL: ICD-10-CM

## 2023-08-21 DIAGNOSIS — F90.0 ATTENTION DEFICIT HYPERACTIVITY DISORDER (ADHD), PREDOMINANTLY INATTENTIVE TYPE: Primary | ICD-10-CM

## 2023-08-21 PROCEDURE — 99214 OFFICE O/P EST MOD 30 MIN: CPT | Mod: 95,,, | Performed by: FAMILY MEDICINE

## 2023-08-21 PROCEDURE — 1160F PR REVIEW ALL MEDS BY PRESCRIBER/CLIN PHARMACIST DOCUMENTED: ICD-10-PCS | Mod: CPTII,95,, | Performed by: FAMILY MEDICINE

## 2023-08-21 PROCEDURE — 1160F RVW MEDS BY RX/DR IN RCRD: CPT | Mod: CPTII,95,, | Performed by: FAMILY MEDICINE

## 2023-08-21 PROCEDURE — 1159F MED LIST DOCD IN RCRD: CPT | Mod: CPTII,95,, | Performed by: FAMILY MEDICINE

## 2023-08-21 PROCEDURE — 99214 PR OFFICE/OUTPT VISIT, EST, LEVL IV, 30-39 MIN: ICD-10-PCS | Mod: 95,,, | Performed by: FAMILY MEDICINE

## 2023-08-21 PROCEDURE — 1159F PR MEDICATION LIST DOCUMENTED IN MEDICAL RECORD: ICD-10-PCS | Mod: CPTII,95,, | Performed by: FAMILY MEDICINE

## 2023-08-21 RX ORDER — BUPROPION HYDROCHLORIDE 150 MG/1
150 TABLET ORAL DAILY
Qty: 90 TABLET | Refills: 3 | Status: SHIPPED | OUTPATIENT
Start: 2023-08-21 | End: 2024-03-12 | Stop reason: SDUPTHER

## 2023-08-21 NOTE — Clinical Note
6 month follow-up ADHD okay to schedule for virtual and send her message of date and time and she can reschedule if it does not work

## 2023-08-21 NOTE — TELEPHONE ENCOUNTER
----- Message from Domitila Ceballos DO sent at 8/21/2023  8:15 AM CDT -----  6 month follow-up ADHD okay to schedule for virtual and send her message of date and time and she can reschedule if it does not work

## 2023-10-26 ENCOUNTER — TELEPHONE (OUTPATIENT)
Dept: NEUROLOGY | Facility: CLINIC | Age: 28
End: 2023-10-26
Payer: COMMERCIAL

## 2023-10-26 NOTE — TELEPHONE ENCOUNTER
----- Message from Frieda Light sent at 10/13/2023 12:57 PM CDT -----  Regarding: self [phone]  Who called: self        What is the request in detail:  pt calling to get PA on erenumab-aooe (AIMOVIG AUTOINJECTOR) 70 mg/mL autoinjector, the provider that usually fill the prescription is no longer with [facility], pt would like call back from nurse in regards to doing the PA, pt stated [facility] was trying to send over PA  to provider but no response        Can the clinic reply by [facility]? No        Would the patient rather a call back or a response via [facility]? Call back       Best call back number:[phone]      Called pt about scheduling appt, LMOR.

## 2023-11-17 ENCOUNTER — OFFICE VISIT (OUTPATIENT)
Dept: SLEEP MEDICINE | Facility: CLINIC | Age: 28
End: 2023-11-17
Payer: COMMERCIAL

## 2023-11-17 DIAGNOSIS — G47.19 EXCESSIVE DAYTIME SLEEPINESS: ICD-10-CM

## 2023-11-17 DIAGNOSIS — G47.30 SLEEP APNEA, UNSPECIFIED TYPE: ICD-10-CM

## 2023-11-17 DIAGNOSIS — G43.009 MIGRAINE WITHOUT AURA AND WITHOUT STATUS MIGRAINOSUS, NOT INTRACTABLE: Primary | ICD-10-CM

## 2023-11-17 DIAGNOSIS — G43.709 CHRONIC MIGRAINE WITHOUT AURA WITHOUT STATUS MIGRAINOSUS, NOT INTRACTABLE: ICD-10-CM

## 2023-11-17 PROCEDURE — 99214 PR OFFICE/OUTPT VISIT, EST, LEVL IV, 30-39 MIN: ICD-10-PCS | Mod: S$GLB,,, | Performed by: NURSE PRACTITIONER

## 2023-11-17 PROCEDURE — 99214 OFFICE O/P EST MOD 30 MIN: CPT | Mod: S$GLB,,, | Performed by: NURSE PRACTITIONER

## 2023-11-17 RX ORDER — ERENUMAB-AOOE 70 MG/ML
70 INJECTION SUBCUTANEOUS
Qty: 1 EACH | Refills: 11 | Status: SHIPPED | OUTPATIENT
Start: 2023-11-17

## 2023-11-17 RX ORDER — RIMEGEPANT SULFATE 75 MG/75MG
75 TABLET, ORALLY DISINTEGRATING ORAL ONCE AS NEEDED
Qty: 16 TABLET | Refills: 11 | Status: SHIPPED | OUTPATIENT
Start: 2023-11-17 | End: 2023-11-22

## 2023-11-17 RX ORDER — CYANOCOBALAMIN (VITAMIN B-12) 250 MCG
250 TABLET ORAL DAILY
COMMUNITY

## 2023-11-17 NOTE — PROGRESS NOTES
cc: Headaches main concern today, daytime sleepiness    She has not had PSG due to cost prohibitive but ins will be changing and may reconsider 2024. She can get herself out of bed more easily when has to work in office and may have more difficulty when working at home, schedule more flexible. She can easily stay alert/awake when engaged and active, but when driving or doing notes it is hard. Wellbutrin xr 150mg qd has helped sleepiness and B12 supplement    Her neuro provider left [facility] so she's been out of Aimovig since July and having more frequent headaches. Wakes daily with them,can be frontal and otc analgesics help. On work days office due to lighting and eye strain she ends up with a migraine. Headaches similar nature/location, Left sided sharp/throbbing with photo/phonophobia and nausea. HA since H. S.     HX 3/2023 She has never had a sleep study. She is always been sleepy since H. S. Would fall asleep in classes.got A's in school.will snooze 30-45min to alarm in mornings. Sleep consolidated but  reports she moves around often/tosses and turns.Hard to get out of bed. Has coffee/espresso in am and during daytime. Sleeps 8-9hrs. Prioritizes sleep time as lack of sleep can trigger migraines. At times has overwhelming urge to nap despite just having ingested caffeine. On 45min drives she would fall asleep and have to sing to stay awake/knew strategies. ESS=14   Denies cataplexy, hypnagogic or hypnopompic hallucinations, or sleep paralysis. +vivid dreams at times/feels very real  Denies oral drying in am  +sinus, switched zyrtec from am to bedtime adm  Migraines less frequent on Aimovig ~ 4x/month  ADHD no formal dx (all family members dx), Focalin takes infrequently but it helps reduce EDS/improves focus when/if has to get work done, prescribed by neuro in [place]  Denies symptoms of restless legs     TRIED( imitrex, maxalt, nurtec borrowed helpful, effexor, topamax, inderal, ajovy, aimovig)    SH:  . 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      ASSESSMENT:   Migraines w/o aura  Unspecified Sleep Apnea, with symptoms of questionable snoring,ongoing un-refreshing disrupted sleep and excessive daytime sleepiness,with medical comorbidities of migraines, myalgia.  Warrants further investigation for untreated sleep apnea.   Hypersomnia unspecified, suspicion central etiology      PLAN:   1. Polysomnogram (consider re-auth new year vs HST)  Resume Aimovig 70mg SC q28d --having IUD removed late Dec  Continue Imtirex 100mg po q2hprn +- nsaid, max 2 tab/d  Begin Nurtec ODT 75mg qd prn as alternative abortive  Rtc 6mos re-eval, sooner if needed.

## 2024-01-26 ENCOUNTER — HOSPITAL ENCOUNTER (OUTPATIENT)
Dept: RADIOLOGY | Facility: HOSPITAL | Age: 29
Discharge: HOME | End: 2024-01-26
Attending: STUDENT IN AN ORGANIZED HEALTH CARE EDUCATION/TRAINING PROGRAM
Payer: COMMERCIAL

## 2024-01-26 ENCOUNTER — OFFICE VISIT (OUTPATIENT)
Dept: PAIN MEDICINE | Facility: CLINIC | Age: 29
End: 2024-01-26
Payer: COMMERCIAL

## 2024-01-26 VITALS
HEART RATE: 103 BPM | DIASTOLIC BLOOD PRESSURE: 76 MMHG | BODY MASS INDEX: 28.67 KG/M2 | HEIGHT: 61 IN | WEIGHT: 151.88 LBS | SYSTOLIC BLOOD PRESSURE: 116 MMHG

## 2024-01-26 DIAGNOSIS — G89.29 CHRONIC NECK PAIN: Primary | ICD-10-CM

## 2024-01-26 DIAGNOSIS — M79.10 MYALGIA: ICD-10-CM

## 2024-01-26 DIAGNOSIS — M54.2 CHRONIC NECK PAIN: ICD-10-CM

## 2024-01-26 DIAGNOSIS — G89.29 CHRONIC NECK PAIN: ICD-10-CM

## 2024-01-26 DIAGNOSIS — M54.2 CHRONIC NECK PAIN: Primary | ICD-10-CM

## 2024-01-26 PROCEDURE — 72052 X-RAY EXAM NECK SPINE 6/>VWS: CPT | Mod: 26,,, | Performed by: RADIOLOGY

## 2024-01-26 PROCEDURE — 3074F SYST BP LT 130 MM HG: CPT | Mod: CPTII,S$GLB,, | Performed by: STUDENT IN AN ORGANIZED HEALTH CARE EDUCATION/TRAINING PROGRAM

## 2024-01-26 PROCEDURE — 99204 OFFICE O/P NEW MOD 45 MIN: CPT | Mod: S$GLB,,, | Performed by: STUDENT IN AN ORGANIZED HEALTH CARE EDUCATION/TRAINING PROGRAM

## 2024-01-26 PROCEDURE — 72052 X-RAY EXAM NECK SPINE 6/>VWS: CPT | Mod: TC

## 2024-01-26 PROCEDURE — 3078F DIAST BP <80 MM HG: CPT | Mod: CPTII,S$GLB,, | Performed by: STUDENT IN AN ORGANIZED HEALTH CARE EDUCATION/TRAINING PROGRAM

## 2024-01-26 PROCEDURE — 3008F BODY MASS INDEX DOCD: CPT | Mod: CPTII,S$GLB,, | Performed by: STUDENT IN AN ORGANIZED HEALTH CARE EDUCATION/TRAINING PROGRAM

## 2024-01-26 PROCEDURE — 99999 PR PBB SHADOW E&M-EST. PATIENT-LVL III: CPT | Mod: PBBFAC,,, | Performed by: STUDENT IN AN ORGANIZED HEALTH CARE EDUCATION/TRAINING PROGRAM

## 2024-01-26 PROCEDURE — 1159F MED LIST DOCD IN RCRD: CPT | Mod: CPTII,S$GLB,, | Performed by: STUDENT IN AN ORGANIZED HEALTH CARE EDUCATION/TRAINING PROGRAM

## 2024-01-26 RX ORDER — CYCLOBENZAPRINE HCL 10 MG
10 TABLET ORAL NIGHTLY PRN
Qty: 90 TABLET | Refills: 0 | Status: SHIPPED | OUTPATIENT
Start: 2024-01-26 | End: 2024-04-25

## 2024-01-26 NOTE — PROGRESS NOTES
Chronic Pain - New Consult    Referring Physician: No ref. provider found    Date: 01/26/2024     Re: Poornima Anaya  MR#: [id number]  YOB: 1995  Age: 29 y.o.    Chief Complaint:   Chief Complaint   Patient presents with    Neck Pain    Shoulder Pain     **This note is dictated using the M*Modal Fluency Direct word recognition program. There are word recognition mistakes that are occasionally missed on review.**    ASSESSMENT: 29 y.o. year old female with neck pain, consistent with     1. Chronic neck pain  Ambulatory referral/consult to [facility]    X-Ray Cervical Spine 5 View With Flex And Ext    cyclobenzaprine (FLEXERIL) 10 MG tablet      2. Myalgia  Ambulatory referral/consult to [facility]    cyclobenzaprine (FLEXERIL) 10 MG tablet        PLAN:     Chronic neck pain likely secondary to muscle pain and hypermobility disorder  - [facility] physical therapy program in [place] since she is moving her next month  -discussed potential trigger point injections in the future, however do not recommend at this time.    -Discussed importance of neck strengthening but avoiding hyperextension and hyperflexion activities   - Flexeril 10 mg q.h.s. p.r.n.    - RTC PRN  - Counseled patient regarding the importance of activity modification and physical therapy.    The above plan and management options were discussed at length with patient. Patient is in agreement with the above and verbalized understanding. It will be communicated with the referring physician via electronic record, fax, or mail.  Lab/study reports reviewed were important and necessary because subsequent medical and treatment recommendations required review of the above lab/study reports. Images viewed/reviewed above were important and necessary because subsequent medical and treatment recommendations required review of the reviewed image(s).     Electronically signed by:  Luigi Escalona  DO  01/26/2024    =========================================================================================================    SUBJECTIVE:    Poornima Anaya is a 29 y.o. female presents to the clinic for the evaluation of neck pain. The pain started years ago following no inciting event and symptoms have been worsening. States that she has a history of joint pains.  She states that she saw rheumatology and was diagnosed with a benign hypermobility syndrome.  She has generalized neck pain.  The neck pain tends to be located at the crook of the neck.  She has some pain that is in the top of her neck.  She has not had any imaging or therapy of the neck. She uses massage balls, aleve, icy hot which help some.    She has a history of headaches and migraines. She is on Aimovig and Nurtec for her headaches.  She states that she was doing weill with Aimovig until about September 2023.  She thinks there may be tension headaches as she thinks when her jaw and neck hurts that her headaches get worse.  She is not sure if she clenches her teeth.  She does not think that she grinds her teeth.  She has used a  and that seems to make things worse.    Pain Description:    The pain is located in the neck area and radiates to the shoulders and jaw area .    At BEST  3/10   At WORST  10/10 on the WORST day.    On average pain is rated as 5/10.   Today the pain is rated as 4/10  The pain is continuous.  The pain is described as burning and tight band    Symptoms interfere with daily activity and work.   Exacerbating factors: Sitting and working with her computer.    Mitigating factors medications.   She reports 6 hours of sleep per night.    Physical Therapy/Home Exercise: No, not currently in physical therapy or home exercise program    Current Pain Medications:    - aleve 2-3x/week, massage, Icy Hot, flexeril (helped)    Failed Pain Medications:    - tylenol    Pain Treatment Therapies:    Pain procedures: none  Physical  Therapy: none  Chiropractor: none  Acupuncture: none  TENS unit: none  Spinal decompression: none  Joint replacement: none    Patient denies urinary incontinence, bowel incontinence, significant motor weakness, and loss of sensations.  Patient denies any suicidal or homicidal ideations     report:  Reviewed and consistent with medication use as prescribed.    Imaging: N/A        1/26/2024     1:17 PM   Pain Disability Index (PDI)   Family/Home Responsibilities: 5   Recreation: 5   Occupation: 5   Sexual Behavior: 5   Self Care: 5   Life-Support Activities: 5   Pain Disability Index (PDI) 35        Past Medical History:   Diagnosis Date    Allergy     Elevated cortisol level 12/29/2022    - she reports a history of a high cortisol level recommended repeat 8 a.m. cortisol which was normal    Migraines     Recurrent upper respiratory infection (URI)     Urticaria      Past Surgical History:   Procedure Laterality Date    ADENOIDECTOMY  12/2016    TONSILLECTOMY Bilateral     TYMPANOSTOMY TUBE PLACEMENT       Social History     Socioeconomic History    Marital status:     Number of children: 0   Tobacco Use    Smoking status: Never     Passive exposure: Never    Smokeless tobacco: Never   Substance and Sexual Activity    Alcohol use: Yes     Alcohol/week: 2.0 standard drinks of alcohol     Types: 2 Glasses of wine per week     Comment: weekend occasional    Drug use: Never    Sexual activity: Yes     Partners: Male     Birth control/protection: I.U.D.   Social History Narrative    . No children. 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 Grew up in the [facility]. Parents now live in [place], [place]. She moved back to [facility] 6/2022     Family History   Problem Relation Age of Onset    Allergies Mother     Heart disease Mother     Migraines Mother     Cancer Father 60        Bladder cancer    Mental illness Father         ADHD and anxiety    Allergies Sister     Migraines Sister     Allergies Brother      Migraines Brother     Breast cancer Maternal Grandmother     Thyroid cancer Maternal Grandmother     Stroke Maternal Grandmother     Atrial fibrillation Maternal Grandmother     Lung cancer Maternal Grandfather     Stroke Paternal Grandmother     No Known Problems Paternal Grandfather        Review of patient's allergies indicates:   Allergen Reactions    Penicillins Hives       Current Outpatient Medications   Medication Sig    buPROPion (WELLBUTRIN XL) 150 MG TB24 tablet Take 1 tablet (150 mg total) by mouth once daily.    cetirizine (ZYRTEC) 10 mg Cap     cyanocobalamin (VITAMIN B-12) 250 MCG tablet Take 250 mcg by mouth once daily. ?dose    erenumab-aooe (AIMOVIG AUTOINJECTOR) 70 mg/mL autoinjector Inject 1 mL (70 mg total) into the skin every 28 days.    fluticasone propionate (FLONASE) 50 mcg/actuation nasal spray 2 sprays (100 mcg total) by Each Nostril route 2 (two) times daily.    pseudoephedrine (SUDAFED) 120 mg 12 hr tablet Take 120 mg by mouth every 12 (twelve) hours as needed.    sumatriptan (IMITREX) 100 MG tablet Take 1 tablet (100 mg total) by mouth 2 (two) times daily as needed for Migraine.    cyclobenzaprine (FLEXERIL) 10 MG tablet Take 1 tablet (10 mg total) by mouth nightly as needed for Muscle spasms.     No current facility-administered medications for this visit.       REVIEW OF SYSTEMS:    GENERAL:  No weight loss, malaise or fevers.  HEENT:   No recent changes in vision or hearing  NECK:  Negative for lumps, no difficulty with swallowing.  RESPIRATORY:  Negative for cough, wheezing or shortness of breath, patient denies any recent URI.  CARDIOVASCULAR:  Negative for chest pain, leg swelling or palpitations.  GI:  Negative for abdominal discomfort, blood in stools or black stools or change in bowel habits.  MUSCULOSKELETAL:  See HPI.  SKIN:  Negative for lesions, rash, and itching.  PSYCH:  No mood disorder or recent psychosocial stressors.  Patients sleep is not disturbed secondary to  "pain.  HEMATOLOGY/LYMPHOLOGY:  Negative for prolonged bleeding, bruising easily or swollen nodes.  Patient is not currently taking any anti-coagulants  NEURO:   No history of headaches, syncope, paralysis, seizures or tremors.  All other reviewed and negative other than HPI.    OBJECTIVE:    /76 (BP Location: Right arm, Patient Position: Sitting)   Pulse 103   Ht 5' 1" (1.549 m)   Wt 68.9 kg (151 lb 14.4 oz)   BMI 28.70 kg/m²     PHYSICAL EXAMINATION:    GENERAL: Well appearing, in no acute distress, alert and oriented x3.  PSYCH:  Mood and affect appropriate.  SKIN: Skin color, texture, turgor normal, no rashes or lesions.  HEAD/FACE:  Normocephalic, atraumatic. Cranial nerves grossly intact.    NECK:   - Positive pain to palpation over the cervical paraspinous muscles.   - Spurling  Negative.  - Negative pain with neck flexion, extension, or lateral flexion.     CV: RRR with palpation of the radial artery.  PULM: CTAB. No evidence of respiratory difficulty, symmetric chest rise.  GI:  Soft and non-tender.    MUSCULOSKELETAL:  No atrophy or tone abnormalities are noted in the UE or LE.  No deformities, edema, or skin discoloration are noted on visible skin. Good capillary refill.    NEURO: Bilateral upper and lower extremity coordination and muscle stretch reflexes are physiologic and symmetric.      NEUROLOGICAL EXAM:  MENTAL STATUS: A x O x 3, good concentration, speech is fluent and goal directed  MEMORY: recent and remote are intact  CN: CN2-12 grossly intact  MOTOR: 5/5 in all muscle groups  DTRs: 2+ intact symmetric  Sensation:    -no Loss of sensation in a left upper and right upper  arm  distribution.    GAIT: normal.    "

## 2024-03-04 ENCOUNTER — CLINICAL SUPPORT (OUTPATIENT)
Dept: REHABILITATION | Facility: HOSPITAL | Age: 29
End: 2024-03-04
Attending: STUDENT IN AN ORGANIZED HEALTH CARE EDUCATION/TRAINING PROGRAM
Payer: COMMERCIAL

## 2024-03-04 DIAGNOSIS — M54.2 CHRONIC NECK PAIN: ICD-10-CM

## 2024-03-04 DIAGNOSIS — G89.29 CHRONIC NECK PAIN: ICD-10-CM

## 2024-03-04 DIAGNOSIS — M79.10 MYALGIA: ICD-10-CM

## 2024-03-04 PROCEDURE — 97112 NEUROMUSCULAR REEDUCATION: CPT | Mod: PN

## 2024-03-04 PROCEDURE — 97161 PT EVAL LOW COMPLEX 20 MIN: CPT | Mod: PN

## 2024-03-05 NOTE — PLAN OF CARE
[facility]   Physical Therapy Initial Evaluation      Date: 3/4/2024     Name: Poornima Anaya  Clinic Number: [id number]  Therapy Diagnosis:   Encounter Diagnoses   Name Primary?    Chronic neck pain     Myalgia       Physician: Luigi Escalona,*      Physician Orders: PT Eval and Treat  Medical Diagnosis from Referral: Chronic neck pain  Evaluation Date: 3/4/2024  Authorization Period Expiration: 12/31/2024  Plan of Care Expiration: 5/3/2024    Progress Update: 4/3/2024   Visit # / Visits authorized: 1 / 1   FOTO: 3/4/2024 - Scored: 1 / 3     MedX Testing:MedX testing visit 1    Time In: 0935  Time Out: 1030  Total Billable Time: 55 minutes  INSURANCE and OUTCOMES: Fee for Service with FOTO Outcomes 1/3    Precautions: standard    Pattern of pain determined:     SUBJECTIVE     Date of onset: A few years and come in waves  Chief Complaint: Neck pain pain    History of current condition - Poornima is a 29 y.o. female who presents to physical therapy reporting ongoing neck pain with waves of intensity.  Highter int    Falls: [x] No  [] Yes:     Imaging: [x] Xray [] MRI [] CT: Reviewed    Prior Therapy:  [x] No  [] Yes:   Social History: Pt lives with their spouse   Occupation: Psychologist with a lot of report  Prior Level of Function: Independent with all activities of daily living  Current Level of Function: Pain with work related activity    Pain:  Current 2/10, worst 7/10, best 2 /10   Location: [] Right [] Left [x] Bilateral: neck  Description: Spasms (casey horse) and feels heavy  Aggravating Factors: typing   Easing Factors: activity avoidance, rest, medication  NeckPattern of pain questions:  1.  Where is your pain the worst? Neck  2.  Is your pain constant or intermittent? Intermittent  3.  Does bending forward make your typical pain worse? Yes  4.  Since the start of your neck pain, has there been a change in your bowel or bladder? No    Red Flag Screening:   Cough/Sneeze  Strain: (--)  Bladder/bowel: (--)  Falls: (--)  Night pain: (+)  Unexplained weight loss: (--)  General health: Good with neck and back pain    Pts goals: Pt reported goals are to improve pain and function    _______________________________________________________  Medical History:   Past Medical History:   Diagnosis Date    Allergy     Elevated cortisol level 12/29/2022    - she reports a history of a high cortisol level recommended repeat 8 a.m. cortisol which was normal    Migraines     Recurrent upper respiratory infection (URI)     Urticaria        Surgical History:   Poornima Anaya  has a past surgical history that includes Tonsillectomy (Bilateral); Tympanostomy tube placement; and Adenoidectomy (12/2016).    Medications:   Poornima has a current medication list which includes the following prescription(s): bupropion, zyrtec, cyanocobalamin, cyclobenzaprine, aimovig autoinjector, fluticasone propionate, pseudoephedrine, and sumatriptan.    Allergies:   Review of patient's allergies indicates:   Allergen Reactions    Penicillins Hives          OBJECTIVE       Postural examination/scapula alignment: Head forward    Range of Motion - MOVEMENT LOSS    ROM Loss   Flexion within functional limits   Extension within functional limits   Side bending Right within functional limits   Side bending Left within functional limits   Rotation Right within functional limits   Rotation Left within functional limits   Protraction within functional limits   Retraction  within functional limits     Upper Extremity Strength  (R) UE  (L) UE    Shoulder flexion: 5/5 Shoulder flexion: 5/5   Shoulder Abduction: 5/5 Shoulder abduction: 5/5   Elbow flexion: 5/5 Elbow flexion: 5/5   Elbow extension: 4/5 Elbow extension: 4/5   Wrist flexion: 5/5 Wrist flexion: 5/5   Wrist extension: 5/5 Wrist extension: 5/5    5/5 : 5/5       Cervical MedX testing Chart  Cervical MedX Testing visit 1   Cervical Isometric Testing on Med X  equipment: Testing administered by PT    Test Initial Midpoint Final   Date 3/4/2024     ROM --- deg     Max Peak Torque       Min Peak Torque       Flex/Ext Ratio      % below normative data      % gain from initial test Not available visit 1       GAIT:  Assistive Device used: none  Level of Assistance: independent  Patient displays the following gait deviations: no gait deviations observed.     Intake Outcome Measure for FOTO Neck Survey    Therapist reviewed FOTO scores for Poornima Anaya on 3/4/2024.   FOTO documents entered into [employer] - see Media section.    Intake Score: 56% functional ability  Goal Score: 68% functional ability        TREATMENT     Total Treatment time separate from Evaluation: (15) minutes    Poornima received the following interventions:       Therapeutic exercises to develop strength, ROM, flexibility, posture, and core stabilization for --- minutes including: ·    Activity   Details                                       Manual therapy techniques: Joint mobilizations, Manual traction, Myofacial release, and Soft tissue Mobilization were applied to the: --- for --- minutes, including:     Activity   Details                                       Neuromuscular re-education activities to improve: Balance, Coordination, Kinesthetic, Sense, Proprioception, Posture for 15 minutes. The following activities were included:    Activity   Details    Chin tuck x     Upper trap stretch x                            Therapeutic activities to improve functional performance for ---  minutes, including:   Activity   Details                                            PATIENT EDUCATION AND HOME EXERCISES     Education/Self-Care provided:  (included in treatment) minutes   Patient educated on the impairments noted above and the effects of physical therapy intervention to improve overall condition and Quality of Life  Patient was educated on all the above exercise prior/during/after for proper posture,  positioning, and execution for safe performance with home exercise program.     Written Home Exercises Provided: yes. Prefers: [x] Printed [] Electronic  Exercises were reviewed and Poornima was able to demonstrate them prior to the end of the session.  Poornima demonstrated good understanding of the education provided. See EMR under Patient Instructions for exercises provided during therapy sessions.      ASSESSMENT     Poornima is a 29 y.o. female referred to outpatient Physical Therapy with a medical diagnosis of   Encounter Diagnoses   Name Primary?    Chronic neck pain     Myalgia    .    Physical exam supports Neck impairments including: decreased range of motion, decreased muscular strength, decreased endurance, decreased muscular length/flexibility, impaired joint mobility, and impaired functional mobility.     The above impairments will be addressed through manual therapy techniques, therapeutic exercises, functional training, and modalities as necessary. Patient was treated and educated on exercises for home program, progression of therapy, and benefits of therapy to achieve full functional mobility.       Pt prognosis is Good.   Pt will benefit from skilled outpatient Physical Therapy to address the deficits stated above and in the chart below, provide pt/family education, and to maximize pt's level of independence.     Plan of care discussed with patient: Yes  Pt's spiritual, cultural and educational needs considered and patient is agreeable to the plan of care and goals as stated below:     Anticipated Barriers for therapy: none    Medical Necessity is demonstrated by the following:     History  Co-morbidities and personal factors that may impact the plan of care [x] LOW: no personal factors / co-morbidities  [] MODERATE: 1-2 personal factors / co-morbidities  [] HIGH: 3+ personal factors / co-morbidities    Moderate / High Support Documentation:   Co-morbidities affecting plan of care:   Past Medical  History:   Diagnosis Date    Allergy     Elevated cortisol level 12/29/2022    - she reports a history of a high cortisol level recommended repeat 8 a.m. cortisol which was normal    Migraines     Recurrent upper respiratory infection (URI)     Urticaria        Personal Factors:   no deficits     Examination  Body Structures and Functions, activity limitations and participation restrictions that may impact the plan of care [x] LOW: addressing 1-2 elements  [] MODERATE: 3+ elements  [] HIGH: 4+ elements (please support below)    Moderate / High Support Documentation:   [x] Head / Neck  [] Spine  [x] Upper Quarter  [] Lower Quarter  [] Range of motion Deficits  [] Gross Symmetry Deficits  [] Strength Deficits  [] Balance Deficits  [] Gait Deficits  [] Unable to participate in daily activities  [] Unable to perform functional tasks  [] Unable to Care for Self or others  [] Community/ Social Life changes due to impairments     Clinical Presentation [x] LOW: stable  [] MODERATE: Evolving  [] HIGH: Unstable     Decision Making/ Complexity Score: low         GOALS: Pt is in agreement with the following goals.    Short term goals: 6 weeks or 10 visits   - Pt will demonstratte increased cervical MedX ROM as measured by med ex by 5 degrees from initial test which results in improved  ROM of neck for ease with ADLs and driving. Appropriate and Ongoing  - Pt will demonstrate independence with reducing or controlling symptoms with ther ex, movement, or position independently, able to reduce pain 1-2 points on pain scale using strategies taught in therapy.  Appropriate and Ongoing  - Pt will demonstrate increased MedX average isometric strength value by 10% with  when compared to the initial testing resulting in improved ability to perform bending, lifting, and carrying activities safely and confidently. Appropriate and Ongoing    Long term goals: 10 weeks or 20 visits  - Pt will demonstratte increased cervical MedX ROM as  measured by med ex by 10 degrees from initial test which results in functional ROM of neck for ease with ADLs and driving.  Appropriate and Ongoing  - Pt will demonstrate increased MedX average isometric strength value  by 20% from initial test to improve ability to lift and carry, and sustain good posture while performing ADL's. Appropriate and Ongoing  - Pt will demonstrate reduced pain and improved functional outcomes as reported on the FOTO by reaching an intake score of >/= 68% functional ability in order to demonstrate subjective improvement in patient's condition. . Appropriate and Ongoing  - Pt will demonstrate independence with reducing or controlling symptoms with ther ex, movement, or position independently, able to reduce pain 2-4 points on pain scale using strategies taught in therapy. Appropriate and Ongoing  - Pt will demonstrate independence with the HEP at discharge. Appropriate and Ongoing    Plan     Outpatient physical therapy 2x week for 10 weeks or 20 visits to include the following:   - Patient education  - Therapeutic exercise  - Manual therapy  - Performance testing   - Neuromuscular Re-education  - Therapeutic activity   - Modalities    Pt may be seen by PTA as part of the rehabilitation team.     Akhil Locke, PT

## 2024-03-07 NOTE — PROGRESS NOTES
VIRTUAL/TELEMEDICINE VISIT   FAMILY MEDICINE  [facility]    The patient location is: [place]  The chief complaint leading to consultation is:   Chief Complaint   Patient presents with    Migraine     Visit type: Virtual visit with synchronous audio and video   Total time spent: 30 minute  Each patient to whom he or she provides medical services by telemedicine is:  (1) informed of the relationship between the physician and patient and the respective role of any other health care provider with respect to management of the patient; and (2) notified that he or she may decline to receive medical services by telemedicine and may withdraw from such care at any time.    Reason for visit:   Chief Complaint   Patient presents with    Migraine       SUBJECTIVE: Poornima Anaya is a 29 y.o. female  - with chronic migraines presents follow-up ADHD and discuss her migraines    Gynecology: Dr. Michelle Piedra ([place])   Neurology:Marily Zhang NP  Allergist Dr. Leanne Thakur MD  Sleep Medicine: Tsering Davis NP  Rheumatology Dr. Demar Monahan MD    Since last visit Poornima Anaya reports that she moved to [place].  She also reports that she recently saw Gynecology.  She had her IUD removed and plans on pregnancy.  She reports her gynecologist told her she can continue her Wellbutrin  mg daily however she needs to discontinue Amovig for migraine management.  His medication has been very effective for her.  Her last injection was 02/28/2024.  Her gynecologist reported that she could take propranolol which she has in the past.  Her biggest concern propanolol is that it made her very fatigued in the past.  She was previously on propranolol extended release 60 mg daily and eventually up titrated to 80 mg daily with good control of her migraine headaches however extreme fatigue.    She is hopeful that the transition we will do fine since her fatigue has greatly improved since on  Wellbutrin as well as on B12 supplementation.  She will need to be off of Aimovig for 2 months prior to conception.    She also started PT for her chronic neck pain and is pleased    1. Attention deficit disorder without hyperactivity    Today 3/12/24: Since last visit she moved to [place], bought a house and started a new job. She only 5 mins from work.  She reports that the Wellbutrin  mg is working very well.  She did try to increase it to 300 mg daily however her headaches worsened.  She reports she was able to focus on her work.  She is less fatigued through the day.  She would like to continue this dose.    Prior note  8/21/2023: Poornima Anaya also starting Wellbutrin  mg daily for ADHD since her last visit.  She only worked for about 2 weeks after starting medication and then she has been transition from her elevation to new job.  She also time time offer her honeymoon.  She starts her new job next week.  She does feel that the 2nd week of on the medication she was more organized at her work.  She was able to get through the day better.  She also reports during their trip she organized all the activities which is typically difficult for her to do.  She did well on Wellbutrin was able to organize all the activities.  She feels good on the medication.  She is slightly concerned about how little work with her new job.  She would like to discuss if she can increase the medication if she finds that is not effective since she will have to focus much more and noted to be less flexibility at this job since her prior job she was working from home  7/6/23: Recently she has more concerns regarding her concentration, jumping from tasks and difficulty completing task. Works remotely and reports that at time she will be late because she cannot get ready in time.  She has had issues in the past and was diagnosed with ADHD in high school.  She did take ADHD medication through high school as well as college  "some in graduate school however and worsened her migraines and she did not want to continue the medication.  She has recently been thinking about starting Wellbutrin for management since she is having more difficulty.  She reports that she is often sleepy when she is doing something "boring".  She reports that she is more active and interested in something she seems to be able to stay awake and maintain somewhat focus however it is becoming increasingly difficult.    Daily Activity:  Works from home    Age diagnosed: high school   Diagnosed by: Pediatrician   Initial evaluation present in chart: No    Past medications: Focalin high school though college and graduate school but had migraines   Reasons for changing past medications:  Headaches    Current medications:   buPROPion (WELLBUTRIN XL) 150 MG TB24 tablet, Take 1 tablet (150 mg total) by mouth once daily     reviewed: NA    Side effects: denies          Review of Systems   HENT:  Negative for hearing loss.    Eyes:  Negative for discharge.   Respiratory:  Negative for wheezing.    Cardiovascular:  Negative for chest pain and palpitations.   Gastrointestinal:  Negative for blood in stool, constipation, diarrhea and vomiting.   Genitourinary:  Negative for dysuria and hematuria.   Musculoskeletal:  Positive for neck pain.   Neurological:  Positive for headaches. Negative for weakness.   Endo/Heme/Allergies:  Negative for polydipsia.   All other systems reviewed and are negative.        HISTORY:   Past Medical History:   Diagnosis Date    Allergy     Elevated cortisol level 12/29/2022    - she reports a history of a high cortisol level recommended repeat 8 a.m. cortisol which was normal    Migraines     Recurrent upper respiratory infection (URI)     Urticaria        Past Surgical History:   Procedure Laterality Date    ADENOIDECTOMY  12/2016    TONSILLECTOMY Bilateral     TYMPANOSTOMY TUBE PLACEMENT         Family History   Problem Relation Age of Onset    " Allergies Mother     Heart disease Mother     Migraines Mother     Cancer Father 60        Bladder cancer    Mental illness Father         ADHD and anxiety    Allergies Sister     Migraines Sister     Allergies Brother     Migraines Brother     Breast cancer Maternal Grandmother     Thyroid cancer Maternal Grandmother     Stroke Maternal Grandmother     Atrial fibrillation Maternal Grandmother     Lung cancer Maternal Grandfather     Stroke Paternal Grandmother     No Known Problems Paternal Grandfather        Social History     Tobacco Use    Smoking status: Never     Passive exposure: Never    Smokeless tobacco: Never   Substance Use Topics    Alcohol use: Yes     Alcohol/week: 2.0 standard drinks of alcohol     Types: 2 Glasses of wine per week     Comment: weekend occasional    Drug use: Never       Social History     Social History Narrative    . No children. 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 [place]. Parents now live in [place], [place]. She moved back to [facility] 6/2022       ALLERGIES:   Review of patient's allergies indicates:   Allergen Reactions    Penicillins Hives       MEDS:   Current Outpatient Medications on File Prior to Visit   Medication Sig Dispense Refill Last Dose    cyanocobalamin (VITAMIN B-12) 250 MCG tablet Take 250 mcg by mouth once daily. ?dose       erenumab-aooe (AIMOVIG AUTOINJECTOR) 70 mg/mL autoinjector Inject 1 mL (70 mg total) into the skin every 28 days. 1 each 11     sumatriptan (IMITREX) 100 MG tablet Take 1 tablet (100 mg total) by mouth 2 (two) times daily as needed for Migraine. 12 tablet 11     cetirizine (ZYRTEC) 10 mg Cap        cyclobenzaprine (FLEXERIL) 10 MG tablet Take 1 tablet (10 mg total) by mouth nightly as needed for Muscle spasms. 90 tablet 0     fluticasone propionate (FLONASE) 50 mcg/actuation nasal spray 2 sprays (100 mcg total) by Each Nostril route 2 (two) times daily. 31.6 mL 5     [DISCONTINUED] buPROPion (WELLBUTRIN XL) 150 MG  TB24 tablet Take 1 tablet (150 mg total) by mouth once daily. 90 tablet 3     [DISCONTINUED] pseudoephedrine (SUDAFED) 120 mg 12 hr tablet Take 120 mg by mouth every 12 (twelve) hours as needed.          Vital signs:   There were no vitals filed for this visit.  There is no height or weight on file to calculate BMI.    PHYSICAL EXAM:     Physical Exam  Constitutional:       General: She is not in acute distress.  Pulmonary:      Effort: Pulmonary effort is normal. No respiratory distress.   Neurological:      Mental Status: She is alert.   Psychiatric:         Mood and Affect: Mood and affect normal.         Speech: Speech normal.           PERTINENT RESULTS:   No visits with results within 1 Week(s) from this visit.   Latest known visit with results is:   Lab Visit on 08/18/2023   Component Date Value Ref Range Status    Vitamin B-12 08/18/2023 1043 (H)  210 - 950 pg/mL Final       ASSESSMENT/PLAN:    1. Attention deficit hyperactivity disorder (ADHD), predominantly inattentive type  Overview:  - diagnosed in high school   - stimulatants worsened her migraines   - well controlled  - continue current management plan   - patient encouraged to notify me with any changes    Orders:  -     buPROPion (WELLBUTRIN XL) 150 MG TB24 tablet; Take 1 tablet (150 mg total) by mouth once daily.  Dispense: 90 tablet; Refill: 3    2. Migraine without aura and without status migrainosus, not intractable  Overview:  - previously followed by Neurology   -she will no longer be able to take Aimovig for migraine prevention since she is considering pregnancy   -gynecology recommended being off the amlodipine for at least 2 months prior to conception   -gynecology recommended propranolol  -she has had extreme fatigue with propranolol in the past   -recommend start with propranolol 20 mg twice a day and uptitrate weekly.  Once she has at a tolerable dose (goal at least 60 mg) we will try to transition her to extended release  - I will touch  base with her weekly    Orders:  -     propranoloL (INDERAL) 20 MG tablet; Take 1 tablet (20 mg total) by mouth 2 (two) times daily.  Dispense: 180 tablet; Refill: 0            ORDERS:   Orders Placed This Encounter    buPROPion (WELLBUTRIN XL) 150 MG TB24 tablet    propranoloL (INDERAL) 20 MG tablet       Vaccines recommended:  Flu and COVID-19    Follow up in about 2 months (around 5/12/2024) for Virtual Visit migraine. or sooner with any concerns      This note is dictated using the M*Modal Fluency Direct word recognition program. There are word recognition mistakes that are occasionally missed on review.    Dr. Domitila Ceballos D.O.   Family Medicine

## 2024-03-08 ENCOUNTER — CLINICAL SUPPORT (OUTPATIENT)
Dept: REHABILITATION | Facility: HOSPITAL | Age: 29
End: 2024-03-08
Payer: COMMERCIAL

## 2024-03-08 DIAGNOSIS — M54.2 CHRONIC NECK PAIN: Primary | ICD-10-CM

## 2024-03-08 DIAGNOSIS — G89.29 CHRONIC NECK PAIN: Primary | ICD-10-CM

## 2024-03-08 PROCEDURE — 97110 THERAPEUTIC EXERCISES: CPT | Mod: PN

## 2024-03-08 PROCEDURE — 97112 NEUROMUSCULAR REEDUCATION: CPT | Mod: PN

## 2024-03-10 NOTE — PROGRESS NOTES
[facility]  Physical Therapy Treatment Note     Name: Poornima Anaya  Clinic Number: [id number]    Therapy Diagnosis:   Encounter Diagnosis   Name Primary?    Chronic neck pain Yes     Physician: Luigi Escalona,*    Visit Date: 3/8/2024    Physician Orders: PT Eval and Treat  Medical Diagnosis from Referral: Chronic neck pain  Evaluation Date: 3/4/2024  Authorization Period Expiration: 12/31/2024  Plan of Care Expiration: 5/3/2024                  Progress Update: 4/3/2024     Visit # / Visits authorized: 1 / 1          FOTO: 3/4/2024 - Scored: 1 / 3      MedX Testing:MedX testing visit 1     Time In: 0935  Time Out: 1030  Total Billable Time: 55 minutes  INSURANCE and OUTCOMES: Fee for Service with FOTO Outcomes 1/3     Precautions: standard     Pattern of pain determined:    Subjective     Poornima Anaya reports No change.      Patient reports tolerating previous visit well  Patient reports their pain to be 3/10 on a 0-10 scale with 0 being no pain and 10 being the worst pain imaginable.  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 and function     Objective      Cervical  Isometric Testing on Med X equipment: Testing administered by PT    Test Initial Baseline Midpoint Final   Date 3/8/2024     ROM 54 deg     Max Peak Torque 74      Min Peak Torque 60      Flex/Ext Ratio      % below normative data -63     % gain from initial test Not available visit 1         Outcomes:  Intake Score: 56%  Visit 6 Score:   Visit 10 Score:   Discharge Score:  Goal Score: 68%     Treatment     Poornima received the treatments listed below:      Medical MedX Treatment as follows:  MedX testing performed day 2: Patient  received neuromuscular education to engage spinal musculature correctly for motor control and engagement of musculature for 15 minutes including the MedX exercise component and practice and  standard testing. MedX dynamic exercise and baseline isometric test performed with instructions to guide the patient safely through the testing procedure. Patient instructed to perform isometric test correctly and safely while building to an optimal force with a pain-free effort. Patient also instructed that they should feel support/pressure from MedX restraints but no pain/discomfort, and encouraged to report any pain to therapist. Patient demonstrated appropriate understanding of information and tolerance of test.  Education regarding purpose of test, safety during test given, and reviewed possible more soreness and strategies.         Poornima participated in neuromuscular re-education activities to improve balance, coordination, proprioception, motor control and/or posture for 20 minutes. The following activities were included:  Chin tuck  Supine cervical protraction  Prone cervical retraction  Prone T      Poornima participated in therapeutic exercises to develop strength, ROM, posture, and core stabilization for 20 minutes including:      Peripheral muscle strengthening which included one set of 15-20 repetitions at a slow and controlled 10-13 second per rep pace focused on strengthening supporting musculature in order to improve body mechanics and functional mobility. Patient and therapist focused on proper form during treatment to ensure optimal strengthening of each targeted muscle group.  Machines utilized included:Chest Press and Rowing  To be added next visit:      Poornima participated in dynamic functional therapeutic activities to improve functional performance and simulate household and community activities for ---  minutes. The following activities were included:        Poornima received manual therapy techniques for ---  minutes. The following activities were included:          Patient Education and Home Exercises     Home exercises include:  Chin tuck  Upper trap stretch    Cardio program (V5):  -  Lifting education (V11): -  Fridge Magnet Discharge handout (date given): -  Equipment at home/gym membership:     Education provided:   - PT role and POC  - HEP    Written Home Exercises Provided: Patient instructed to cont prior HEP.  Exercises were reviewed and Poornima was able to demonstrate them prior to the end of the session.  Poornima demonstrated good  understanding of the education provided.     See EMR under Patient Instructions for exercises provided prior visit.    Assessment     Poornima presents to second healthy back visit reporting neck pain, was able to demo HEP with Mod VC for form. Pt was able to tolerate Medical MedX machine well as follows:  MedX testing performed and patient tolerated test well.  Pt was also able to complete half of the peripheral strengthening exercises without increased discomfort and will complete the complete circuit next visit as tolerated.    Patient is making good progress towards established goals.  Pt will continue to benefit from skilled outpatient physical therapy to address the deficits stated in the impairment chart, provide pt/family education and to maximize pt's level of independence in the home and community environment.     Anticipated Barriers for therapy: None  Pt's spiritual, cultural and educational needs considered and pt agreeable to plan of care and goals as stated below:     Goals:   GOALS: Pt is in agreement with the following goals.     Short term goals: 6 weeks or 10 visits   - Pt will demonstratte increased cervical MedX ROM as measured by med ex by 5 degrees from initial test which results in improved  ROM of neck for ease with ADLs and driving. Appropriate and Ongoing  - Pt will demonstrate independence with reducing or controlling symptoms with ther ex, movement, or position independently, able to reduce pain 1-2 points on pain scale using strategies taught in therapy.  Appropriate and Ongoing  - Pt will demonstrate increased MedX average  isometric strength value by 10% with  when compared to the initial testing resulting in improved ability to perform bending, lifting, and carrying activities safely and confidently. Appropriate and Ongoing     Long term goals: 10 weeks or 20 visits  - Pt will demonstratte increased cervical MedX ROM as measured by med ex by 10 degrees from initial test which results in functional ROM of neck for ease with ADLs and driving.  Appropriate and Ongoing  - Pt will demonstrate increased MedX average isometric strength value  by 20% from initial test to improve ability to lift and carry, and sustain good posture while performing ADL's. Appropriate and Ongoing  - Pt will demonstrate reduced pain and improved functional outcomes as reported on the FOTO by reaching an intake score of >/= 68% functional ability in order to demonstrate subjective improvement in patient's condition. . Appropriate and Ongoing  - Pt will demonstrate independence with reducing or controlling symptoms with ther ex, movement, or position independently, able to reduce pain 2-4 points on pain scale using strategies taught in therapy. Appropriate and Ongoing  - Pt will demonstrate independence with the HEP at discharge. Appropriate and Ongoing    Plan     Continue with established Plan of Care towards established PT goals.     Therapist: Akhil Locke, PT  3/10/2024

## 2024-03-11 ENCOUNTER — CLINICAL SUPPORT (OUTPATIENT)
Dept: REHABILITATION | Facility: HOSPITAL | Age: 29
End: 2024-03-11
Payer: COMMERCIAL

## 2024-03-11 DIAGNOSIS — M54.2 CHRONIC NECK PAIN: Primary | ICD-10-CM

## 2024-03-11 DIAGNOSIS — G89.29 CHRONIC NECK PAIN: Primary | ICD-10-CM

## 2024-03-11 PROCEDURE — 97112 NEUROMUSCULAR REEDUCATION: CPT | Mod: PN

## 2024-03-11 PROCEDURE — 97110 THERAPEUTIC EXERCISES: CPT | Mod: PN

## 2024-03-11 NOTE — PROGRESS NOTES
[facility]  Physical Therapy Treatment Note     Name: Poornima Anaya  Clinic Number: [id number]    Therapy Diagnosis:   Encounter Diagnosis   Name Primary?    Chronic neck pain Yes       Physician: Luigi Escalona,*    Visit Date: 3/11/2024    Physician Orders: PT Eval and Treat  Medical Diagnosis from Referral: Chronic neck pain  Evaluation Date: 3/4/2024  Authorization Period Expiration: 12/31/2024  Plan of Care Expiration: 5/3/2024                  Progress Update: 4/3/2024     Visit # / Visits authorized: 1 / 1 2 /20    FOTO: 3/4/2024 - Scored: 1 / 3      MedX Testing:MedX testing visit 1     Time In: 0935  Time Out: 1030  Total Billable Time: 55 minutes  INSURANCE and OUTCOMES: Fee for Service with FOTO Outcomes 1/3     Precautions: standard     Pattern of pain determined:    Subjective     Poornima Anaya reports No change.      Patient reports tolerating previous visit well  Patient reports their pain to be 3/10 on a 0-10 scale with 0 being no pain and 10 being the worst pain imaginable.  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 and function     Objective      Cervical  Isometric Testing on Med X equipment: Testing administered by PT    Test Initial Baseline Midpoint Final   Date 3/8/2024     ROM 54 deg     Max Peak Torque 74      Min Peak Torque 60      Flex/Ext Ratio      % below normative data -63     % gain from initial test Not available visit 1         Outcomes:  Intake Score: 56%  Visit 6 Score:   Visit 10 Score:   Discharge Score:  Goal Score: 68%     Treatment     Poornima received the treatments listed below:      Medical MedX Treatment as follows:  MedX testing performed day 2: Patient  received neuromuscular education to engage spinal musculature correctly for motor control and engagement of musculature for 15 minutes including the MedX exercise component and practice and  standard testing. MedX dynamic exercise and baseline isometric test performed with instructions to guide the patient safely through the testing procedure. Patient instructed to perform isometric test correctly and safely while building to an optimal force with a pain-free effort. Patient also instructed that they should feel support/pressure from MedX restraints but no pain/discomfort, and encouraged to report any pain to therapist. Patient demonstrated appropriate understanding of information and tolerance of test.  Education regarding purpose of test, safety during test given, and reviewed possible more soreness and strategies.       Poornima participated in therapeutic exercises to develop strength, ROM, posture, and core stabilization for 10 minutes including:       Activity   Details    UBE x     Upper trap stretch      Doorway stretch x                            Peripheral muscle strengthening which included one set of 15-20 repetitions at a slow and controlled 10-13 second per rep pace focused on strengthening supporting musculature in order to improve body mechanics and functional mobility. Patient and therapist focused on proper form during treatment to ensure optimal strengthening of each targeted muscle group.  Machines utilized included:Chest Press and Rowing  To be added next visit:      Poornima participated in neuromuscular re-education activities to improve balance, coordination, proprioception, motor control and/or posture for 45 minutes. The following activities were included:  Chin tuck  Supine cervical protraction  Prone cervical retraction  Prone T     Activity   Details    MedX cervical  See flowsheet    MedX rotation  20# 20 (B)    Matrix rows  25# 20    Matrix chest press  10# 20    Cable shoulder extension  20# 3x10    Shoulder flexion in scaption  2# 20    Shoulder lateral raises to 90  2# 20          Supine cervical protraction  3x10    Open books  2x10    Prone T  3x10    Prone W  3x10     Prone cervical retraction  3x10                 Poornima participated in dynamic functional therapeutic activities to improve functional performance and simulate household and community activities for ---  minutes. The following activities were included:       Activity   Details                                             Poornima received manual therapy techniques for ---  minutes. The following activities were included:          Patient Education and Home Exercises     Home exercises include:  Chin tuck  Upper trap stretch    Cardio program (V5): -  Lifting education (V11): -  Fridge Magnet Discharge handout (date given): -  Equipment at home/gym membership:     Education provided:   - PT role and POC  - HEP    Written Home Exercises Provided: Patient instructed to cont prior HEP.  Exercises were reviewed and Poornima was able to demonstrate them prior to the end of the session.  Poornima demonstrated good  understanding of the education provided.     See EMR under Patient Instructions for exercises provided prior visit.    Assessment     Poornima presents to second healthy back visit reporting neck pain, was able to demo HEP with Mod VC for form. Pt was able to tolerate Medical MedX machine well as follows:  MedX testing performed and patient tolerated test well.  Pt was also able to complete half of the peripheral strengthening exercises without increased discomfort and will complete the complete circuit next visit as tolerated.    Patient is making good progress towards established goals.  Pt will continue to benefit from skilled outpatient physical therapy to address the deficits stated in the impairment chart, provide pt/family education and to maximize pt's level of independence in the home and community environment.     Anticipated Barriers for therapy: None  Pt's spiritual, cultural and educational needs considered and pt agreeable to plan of care and goals as stated below:     Goals:   GOALS: Pt is in  agreement with the following goals.     Short term goals: 6 weeks or 10 visits   - Pt will demonstratte increased cervical MedX ROM as measured by med ex by 5 degrees from initial test which results in improved  ROM of neck for ease with ADLs and driving. Appropriate and Ongoing  - Pt will demonstrate independence with reducing or controlling symptoms with ther ex, movement, or position independently, able to reduce pain 1-2 points on pain scale using strategies taught in therapy.  Appropriate and Ongoing  - Pt will demonstrate increased MedX average isometric strength value by 10% with  when compared to the initial testing resulting in improved ability to perform bending, lifting, and carrying activities safely and confidently. Appropriate and Ongoing     Long term goals: 10 weeks or 20 visits  - Pt will demonstratte increased cervical MedX ROM as measured by med ex by 10 degrees from initial test which results in functional ROM of neck for ease with ADLs and driving.  Appropriate and Ongoing  - Pt will demonstrate increased MedX average isometric strength value  by 20% from initial test to improve ability to lift and carry, and sustain good posture while performing ADL's. Appropriate and Ongoing  - Pt will demonstrate reduced pain and improved functional outcomes as reported on the FOTO by reaching an intake score of >/= 68% functional ability in order to demonstrate subjective improvement in patient's condition. . Appropriate and Ongoing  - Pt will demonstrate independence with reducing or controlling symptoms with ther ex, movement, or position independently, able to reduce pain 2-4 points on pain scale using strategies taught in therapy. Appropriate and Ongoing  - Pt will demonstrate independence with the HEP at discharge. Appropriate and Ongoing    Plan     Continue with established Plan of Care towards established PT goals.     Therapist: Akhil Locke, PT  3/14/2024

## 2024-03-12 ENCOUNTER — OFFICE VISIT (OUTPATIENT)
Dept: PRIMARY CARE CLINIC | Facility: CLINIC | Age: 29
End: 2024-03-12
Attending: FAMILY MEDICINE
Payer: COMMERCIAL

## 2024-03-12 DIAGNOSIS — G43.009 MIGRAINE WITHOUT AURA AND WITHOUT STATUS MIGRAINOSUS, NOT INTRACTABLE: ICD-10-CM

## 2024-03-12 DIAGNOSIS — F90.0 ATTENTION DEFICIT HYPERACTIVITY DISORDER (ADHD), PREDOMINANTLY INATTENTIVE TYPE: Primary | ICD-10-CM

## 2024-03-12 PROCEDURE — 99214 OFFICE O/P EST MOD 30 MIN: CPT | Mod: 95,,, | Performed by: FAMILY MEDICINE

## 2024-03-12 PROCEDURE — 1159F MED LIST DOCD IN RCRD: CPT | Mod: CPTII,95,, | Performed by: FAMILY MEDICINE

## 2024-03-12 PROCEDURE — 1160F RVW MEDS BY RX/DR IN RCRD: CPT | Mod: CPTII,95,, | Performed by: FAMILY MEDICINE

## 2024-03-12 RX ORDER — PROPRANOLOL HYDROCHLORIDE 20 MG/1
20 TABLET ORAL 2 TIMES DAILY
Qty: 180 TABLET | Refills: 0 | Status: SHIPPED | OUTPATIENT
Start: 2024-03-12 | End: 2024-05-08 | Stop reason: DRUGHIGH

## 2024-03-12 RX ORDER — BUPROPION HYDROCHLORIDE 150 MG/1
150 TABLET ORAL DAILY
Qty: 90 TABLET | Refills: 3 | Status: SHIPPED | OUTPATIENT
Start: 2024-03-12 | End: 2025-03-12

## 2024-03-15 ENCOUNTER — CLINICAL SUPPORT (OUTPATIENT)
Dept: REHABILITATION | Facility: HOSPITAL | Age: 29
End: 2024-03-15
Payer: COMMERCIAL

## 2024-03-15 DIAGNOSIS — M54.2 CHRONIC NECK PAIN: Primary | ICD-10-CM

## 2024-03-15 DIAGNOSIS — G89.29 CHRONIC NECK PAIN: Primary | ICD-10-CM

## 2024-03-15 PROCEDURE — 97110 THERAPEUTIC EXERCISES: CPT | Mod: PN

## 2024-03-15 PROCEDURE — 97112 NEUROMUSCULAR REEDUCATION: CPT | Mod: PN

## 2024-03-15 NOTE — PROGRESS NOTES
[facility]  Physical Therapy Treatment Note     Name: Poornima Anaya  Clinic Number: [id number]    Therapy Diagnosis:   Encounter Diagnosis   Name Primary?    Chronic neck pain Yes     Physician: Luigi Escalona,*    Visit Date: 3/15/2024    Physician Orders: PT Eval and Treat  Medical Diagnosis from Referral: Chronic neck pain  Evaluation Date: 3/4/2024  Authorization Period Expiration: 12/31/2024  Plan of Care Expiration: 5/3/2024                  Progress Update: 4/3/2024     Visit # / Visits authorized: 1 / 1   3  /20    FOTO: 3/4/2024 - Scored: 1 / 3      MedX Testing:MedX testing visit 2     Time In: 0831  Time Out: 0925  Total Billable Time: 54 minutes  INSURANCE and OUTCOMES: Fee for Service with FOTO Outcomes 1/3     Precautions: standard     Pattern of pain determined:    Subjective     Poornima Anaya reports soreness after last session in the neck musxles    Patient reports tolerating previous visit well  Patient reports their pain to be 3/10 on a 0-10 scale with 0 being no pain and 10 being the worst pain imaginable.  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 and function     Objective      Cervical  Isometric Testing on Med X equipment: Testing administered by PT    Test Initial Baseline Midpoint Final   Date 3/8/2024     ROM 54 deg     Max Peak Torque 74      Min Peak Torque 60      Flex/Ext Ratio      % below normative data -63     % gain from initial test Not available visit 1         Outcomes:  Intake Score: 56%  Visit 6 Score:   Visit 10 Score:   Discharge Score:  Goal Score: 68%     Treatment     Poornima received the treatments listed below:          Medical MedX Treatment as follows:  MedX testing performed day 2: Patient  received neuromuscular education to engage spinal musculature correctly for motor control and engagement of musculature for 15 minutes including the MedX  exercise component and practice and standard testing. MedX dynamic exercise and baseline isometric test performed with instructions to guide the patient safely through the testing procedure. Patient instructed to perform isometric test correctly and safely while building to an optimal force with a pain-free effort. Patient also instructed that they should feel support/pressure from MedX restraints but no pain/discomfort, and encouraged to report any pain to therapist. Patient demonstrated appropriate understanding of information and tolerance of test.  Education regarding purpose of test, safety during test given, and reviewed possible more soreness and strategies.           3/15/2024     8:55 AM   [facility]   Visit Number 3   Cervical Weight 65 lbs   Repetitions 20   Rating of Perceived Exertion 4        Poornima participated in therapeutic exercises to develop strength, ROM, posture, and core stabilization for 10 minutes including:       Activity   Details    UBE x     Upper trap stretch      Doorway stretch x                            Peripheral muscle strengthening which included one set of 15-20 repetitions at a slow and controlled 10-13 second per rep pace focused on strengthening supporting musculature in order to improve body mechanics and functional mobility. Patient and therapist focused on proper form during treatment to ensure optimal strengthening of each targeted muscle group.  Machines utilized included:Chest Press and Rowing  To be added next visit:      Poornima participated in neuromuscular re-education activities to improve balance, coordination, proprioception, motor control and/or posture for 44 minutes. The following activities were included:  Chin tuck  Supine cervical protraction  Prone cervical retraction  Prone T     Activity   Details    MedX cervical x See flowsheet    MedX rotation x 20# 20 (B)    Matrix rows x 25# 20    Matrix chest press x 10# 20    Cable shoulder extension x  20# 3x10    Shoulder flexion in scaption x 2# 20    Shoulder lateral raises to 90 x 2# 20    Shrugs x 3x10    Supine cervical protraction x 3x10    Open books x 2x10    Prone T x 3x10    Prone W x 3x10    Prone cervical retraction x 3x10                 Poornima participated in dynamic functional therapeutic activities to improve functional performance and simulate household and community activities for ---  minutes. The following activities were included:       Activity   Details                                             Poornima received manual therapy techniques for ---  minutes. The following activities were included:          Patient Education and Home Exercises     Home exercises include:  Chin tuck  Upper trap stretch    Cardio program (V5): -  Lifting education (V11): -  Fridge Magnet Discharge handout (date given): -  Equipment at home/gym membership:     Education provided:   - PT role and POC  - HEP    Written Home Exercises Provided: Patient instructed to cont prior HEP.  Exercises were reviewed and Poornima was able to demonstrate them prior to the end of the session.  Poornima demonstrated good  understanding of the education provided.     See EMR under Patient Instructions for exercises provided prior visit.    Assessment     Poornima presents to second healthy back visit reporting neck pain, was able to demo HEP with Mod VC for form. Pt was able to tolerate Medical MedX machine well as follows:  MedX testing performed and patient tolerated test well.  Pt was also able to complete half of the peripheral strengthening exercises without increased discomfort and will complete the complete circuit next visit as tolerated.    Patient is making good progress towards established goals.  Pt will continue to benefit from skilled outpatient physical therapy to address the deficits stated in the impairment chart, provide pt/family education and to maximize pt's level of independence in the home and community  environment.     Anticipated Barriers for therapy: None  Pt's spiritual, cultural and educational needs considered and pt agreeable to plan of care and goals as stated below:     Goals:   GOALS: Pt is in agreement with the following goals.     Short term goals: 6 weeks or 10 visits   - Pt will demonstratte increased cervical MedX ROM as measured by med ex by 5 degrees from initial test which results in improved  ROM of neck for ease with ADLs and driving. Appropriate and Ongoing  - Pt will demonstrate independence with reducing or controlling symptoms with ther ex, movement, or position independently, able to reduce pain 1-2 points on pain scale using strategies taught in therapy.  Appropriate and Ongoing  - Pt will demonstrate increased MedX average isometric strength value by 10% with  when compared to the initial testing resulting in improved ability to perform bending, lifting, and carrying activities safely and confidently. Appropriate and Ongoing     Long term goals: 10 weeks or 20 visits  - Pt will demonstratte increased cervical MedX ROM as measured by med ex by 10 degrees from initial test which results in functional ROM of neck for ease with ADLs and driving.  Appropriate and Ongoing  - Pt will demonstrate increased MedX average isometric strength value  by 20% from initial test to improve ability to lift and carry, and sustain good posture while performing ADL's. Appropriate and Ongoing  - Pt will demonstrate reduced pain and improved functional outcomes as reported on the FOTO by reaching an intake score of >/= 68% functional ability in order to demonstrate subjective improvement in patient's condition. . Appropriate and Ongoing  - Pt will demonstrate independence with reducing or controlling symptoms with ther ex, movement, or position independently, able to reduce pain 2-4 points on pain scale using strategies taught in therapy. Appropriate and Ongoing  - Pt will demonstrate independence with the HEP  at discharge. Appropriate and Ongoing    Plan     Continue with established Plan of Care towards established PT goals.     Therapist: Akhil Locke, PT  3/17/2024

## 2024-03-19 ENCOUNTER — PATIENT MESSAGE (OUTPATIENT)
Dept: PRIMARY CARE CLINIC | Facility: CLINIC | Age: 29
End: 2024-03-19
Payer: COMMERCIAL

## 2024-03-22 ENCOUNTER — CLINICAL SUPPORT (OUTPATIENT)
Dept: REHABILITATION | Facility: HOSPITAL | Age: 29
End: 2024-03-22
Payer: COMMERCIAL

## 2024-03-22 ENCOUNTER — PATIENT MESSAGE (OUTPATIENT)
Dept: PRIMARY CARE CLINIC | Facility: CLINIC | Age: 29
End: 2024-03-22
Payer: COMMERCIAL

## 2024-03-22 DIAGNOSIS — G89.29 CHRONIC NECK PAIN: Primary | ICD-10-CM

## 2024-03-22 DIAGNOSIS — M54.2 CHRONIC NECK PAIN: Primary | ICD-10-CM

## 2024-03-22 PROCEDURE — 97112 NEUROMUSCULAR REEDUCATION: CPT | Mod: PN

## 2024-03-22 PROCEDURE — 97110 THERAPEUTIC EXERCISES: CPT | Mod: PN

## 2024-03-22 NOTE — PROGRESS NOTES
[facility]  Physical Therapy Treatment Note     Name: Poornima Anaya  Clinic Number: [id number]    Therapy Diagnosis:   Encounter Diagnosis   Name Primary?    Chronic neck pain Yes     Physician: Luigi Escalona,*    Visit Date: 3/22/2024    Physician Orders: PT Eval and Treat  Medical Diagnosis from Referral: Chronic neck pain  Evaluation Date: 3/4/2024  Authorization Period Expiration: 12/31/2024  Plan of Care Expiration: 5/3/2024                  Progress Update: 4/3/2024     Visit # / Visits authorized: 1 / 1 4 /20    FOTO: 3/4/2024 - Scored: 1 / 3      MedX Testing:MedX testing visit 2     Time In: 0835  Time Out: 0930  Total Billable Time: 55 minutes  INSURANCE and OUTCOMES: Fee for Service with FOTO Outcomes 1/3     Precautions: standard     Pattern of pain determined:    Subjective     Poornima Anaya reports soreness     Patient reports tolerating previous visit well  Patient reports their pain to be 3/10 on a 0-10 scale with 0 being no pain and 10 being the worst pain imaginable.  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 and function     Objective      Cervical  Isometric Testing on Med X equipment: Testing administered by PT    Test Initial Baseline Midpoint Final   Date 3/8/2024     ROM 54 deg     Max Peak Torque 74      Min Peak Torque 60      Flex/Ext Ratio      % below normative data -63     % gain from initial test Not available visit 1         Outcomes:  Intake Score: 56%  Visit 6 Score:   Visit 10 Score:   Discharge Score:  Goal Score: 68%     Treatment     Poornima received the treatments listed below:          Medical MedX Treatment as follows:  MedX testing performed day 2: Patient  received neuromuscular education to engage spinal musculature correctly for motor control and engagement of musculature for 15 minutes including the MedX exercise component and practice and  standard testing. MedX dynamic exercise and baseline isometric test performed with instructions to guide the patient safely through the testing procedure. Patient instructed to perform isometric test correctly and safely while building to an optimal force with a pain-free effort. Patient also instructed that they should feel support/pressure from MedX restraints but no pain/discomfort, and encouraged to report any pain to therapist. Patient demonstrated appropriate understanding of information and tolerance of test.  Education regarding purpose of test, safety during test given, and reviewed possible more soreness and strategies.       Poornima participated in therapeutic exercises to develop strength, ROM, posture, and core stabilization for 10 minutes including:       Activity   Details    UBE x     Upper trap stretch      Doorway stretch x                            Peripheral muscle strengthening which included one set of 15-20 repetitions at a slow and controlled 10-13 second per rep pace focused on strengthening supporting musculature in order to improve body mechanics and functional mobility. Patient and therapist focused on proper form during treatment to ensure optimal strengthening of each targeted muscle group.  Machines utilized included:Chest Press and Rowing  To be added next visit:      Poornima participated in neuromuscular re-education activities to improve balance, coordination, proprioception, motor control and/or posture for 44 minutes. The following activities were included:  Chin tuck  Supine cervical protraction  Prone cervical retraction  Prone T     Activity   Details    MedX cervical x See flowsheet    MedX rotation x 20# 20 (B)    Matrix rows x 25# 20    Matrix chest press x 10# 20    Cable shoulder extension x 20# 3x10    Shoulder flexion in scaption x 2# 20    Shoulder lateral raises to 90 x 2# 20    Shrugs x 3x10    Supine cervical protraction x 3x10    Open books x 2x10    Prone T x 3x10     Prone W x 3x10    Prone cervical retraction x 3x10                 Poornima participated in dynamic functional therapeutic activities to improve functional performance and simulate household and community activities for ---  minutes. The following activities were included:       Activity   Details                                             Poornima received manual therapy techniques for ---  minutes. The following activities were included:          Patient Education and Home Exercises     Home exercises include:  Chin tuck  Upper trap stretch    Cardio program (V5): -  Lifting education (V11): -  Fridge Magnet Discharge handout (date given): -  Equipment at home/gym membership:     Education provided:   - PT role and POC  - HEP    Written Home Exercises Provided: Patient instructed to cont prior HEP.  Exercises were reviewed and Poornima was able to demonstrate them prior to the end of the session.  Poornima demonstrated good  understanding of the education provided.     See EMR under Patient Instructions for exercises provided prior visit.    Assessment     Poornima presents to second healthy back visit reporting neck pain, was able to demo HEP with Mod VC for form. Pt was able to tolerate Medical MedX machine well as follows:  MedX testing performed and patient tolerated test well.  Pt was also able to complete half of the peripheral strengthening exercises without increased discomfort and will complete the complete circuit next visit as tolerated.    Patient is making good progress towards established goals.  Pt will continue to benefit from skilled outpatient physical therapy to address the deficits stated in the impairment chart, provide pt/family education and to maximize pt's level of independence in the home and community environment.     Anticipated Barriers for therapy: None  Pt's spiritual, cultural and educational needs considered and pt agreeable to plan of care and goals as stated below:     Goals:    GOALS: Pt is in agreement with the following goals.     Short term goals: 6 weeks or 10 visits   - Pt will demonstratte increased cervical MedX ROM as measured by med ex by 5 degrees from initial test which results in improved  ROM of neck for ease with ADLs and driving. Appropriate and Ongoing  - Pt will demonstrate independence with reducing or controlling symptoms with ther ex, movement, or position independently, able to reduce pain 1-2 points on pain scale using strategies taught in therapy.  Appropriate and Ongoing  - Pt will demonstrate increased MedX average isometric strength value by 10% with  when compared to the initial testing resulting in improved ability to perform bending, lifting, and carrying activities safely and confidently. Appropriate and Ongoing     Long term goals: 10 weeks or 20 visits  - Pt will demonstratte increased cervical MedX ROM as measured by med ex by 10 degrees from initial test which results in functional ROM of neck for ease with ADLs and driving.  Appropriate and Ongoing  - Pt will demonstrate increased MedX average isometric strength value  by 20% from initial test to improve ability to lift and carry, and sustain good posture while performing ADL's. Appropriate and Ongoing  - Pt will demonstrate reduced pain and improved functional outcomes as reported on the FOTO by reaching an intake score of >/= 68% functional ability in order to demonstrate subjective improvement in patient's condition. . Appropriate and Ongoing  - Pt will demonstrate independence with reducing or controlling symptoms with ther ex, movement, or position independently, able to reduce pain 2-4 points on pain scale using strategies taught in therapy. Appropriate and Ongoing  - Pt will demonstrate independence with the HEP at discharge. Appropriate and Ongoing    Plan     Continue with established Plan of Care towards established PT goals.     Therapist: Akhil Locke, PT  3/22/2024

## 2024-03-25 ENCOUNTER — CLINICAL SUPPORT (OUTPATIENT)
Dept: REHABILITATION | Facility: HOSPITAL | Age: 29
End: 2024-03-25
Payer: COMMERCIAL

## 2024-03-25 DIAGNOSIS — M54.2 CHRONIC NECK PAIN: Primary | ICD-10-CM

## 2024-03-25 DIAGNOSIS — G89.29 CHRONIC NECK PAIN: Primary | ICD-10-CM

## 2024-03-25 PROCEDURE — 97112 NEUROMUSCULAR REEDUCATION: CPT | Mod: PN

## 2024-03-25 PROCEDURE — 97110 THERAPEUTIC EXERCISES: CPT | Mod: PN

## 2024-03-25 NOTE — PROGRESS NOTES
[facility]  Physical Therapy Treatment Note     Name: Poornima Anaya  Clinic Number: [id number]    Therapy Diagnosis:   Encounter Diagnosis   Name Primary?    Chronic neck pain Yes     Physician: Luigi Escalona,*    Visit Date: 3/25/2024    Physician Orders: PT Eval and Treat  Medical Diagnosis from Referral: Chronic neck pain  Evaluation Date: 3/4/2024  Authorization Period Expiration: 12/31/2024  Plan of Care Expiration: 5/3/2024                  Progress Update: 4/3/2024     Visit # / Visits authorized: 1 / 1 5 /20    FOTO: 3/25/2024 - Scored: 2 / 3      MedX Testing:MedX testing visit 2     Time In: 0838  Time Out: 0934  Total Billable Time: 55 minutes  INSURANCE and OUTCOMES: Fee for Service with FOTO Outcomes 1/3     Precautions: standard     Pattern of pain determined:    Subjective     Poornima Anaya reports soreness     Patient reports tolerating previous visit well  Patient reports their pain to be 3/10 on a 0-10 scale with 0 being no pain and 10 being the worst pain imaginable.  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 and function     Objective      Cervical  Isometric Testing on Med X equipment: Testing administered by PT    Test Initial Baseline Midpoint Final   Date 3/8/2024     ROM 54 deg     Max Peak Torque 74      Min Peak Torque 60      Flex/Ext Ratio      % below normative data -63     % gain from initial test Not available visit 1         Outcomes:  Intake Score: 56%  Visit 6 Score:   Visit 10 Score:   Discharge Score:  Goal Score: 68%     Treatment     Poornima received the treatments listed below:          3/25/2024     9:01 AM   [facility]   Visit Number 5   VAS Pain Rating 4   Cervical Weight 72 lbs   Repetitions 20   Rating of Perceived Exertion 4          Medical MedX Treatment as follows:  MedX testing performed day 2: Patient  received neuromuscular  education to engage spinal musculature correctly for motor control and engagement of musculature for 15 minutes including the MedX exercise component and practice and standard testing. MedX dynamic exercise and baseline isometric test performed with instructions to guide the patient safely through the testing procedure. Patient instructed to perform isometric test correctly and safely while building to an optimal force with a pain-free effort. Patient also instructed that they should feel support/pressure from MedX restraints but no pain/discomfort, and encouraged to report any pain to therapist. Patient demonstrated appropriate understanding of information and tolerance of test.  Education regarding purpose of test, safety during test given, and reviewed possible more soreness and strategies.       Poornima participated in therapeutic exercises to develop strength, ROM, posture, and core stabilization for 10 minutes including:       Activity   Details    UBE x     Upper trap stretch      Doorway stretch x                            Peripheral muscle strengthening which included one set of 15-20 repetitions at a slow and controlled 10-13 second per rep pace focused on strengthening supporting musculature in order to improve body mechanics and functional mobility. Patient and therapist focused on proper form during treatment to ensure optimal strengthening of each targeted muscle group.  Machines utilized included:Chest Press and Rowing  To be added next visit:      Poornima participated in neuromuscular re-education activities to improve balance, coordination, proprioception, motor control and/or posture for 45 minutes. The following activities were included:  Chin tuck  Supine cervical protraction  Prone cervical retraction  Prone T     Activity   Details    MedX cervical x See flowsheet    MedX rotation x 20# 20 (B)    Matrix rows x 25# 20    Matrix chest press x 10# 20    Cable shoulder extension x 20# 3x10     Shoulder flexion in scaption x 2# 20    Shoulder lateral raises to 90 x 2# 20    Shrugs x 3x10    Supine cervical protraction x 3x10    Open books x 2x10    Prone T on ball x 3x10    Prone W on ball x 3x10    Prone cervical retraction x 3x10                 Poornima participated in dynamic functional therapeutic activities to improve functional performance and simulate household and community activities for ---  minutes. The following activities were included:       Activity   Details                                             Poornima received manual therapy techniques for ---  minutes. The following activities were included:          Patient Education and Home Exercises     Home exercises include:  Chin tuck  Upper trap stretch    Cardio program (V5): -  Lifting education (V11): -  Fridge Magnet Discharge handout (date given): -  Equipment at home/gym membership:     Education provided:   - PT role and POC  - HEP    Written Home Exercises Provided: Patient instructed to cont prior HEP.  Exercises were reviewed and Poornima was able to demonstrate them prior to the end of the session.  Poornima demonstrated good  understanding of the education provided.     See EMR under Patient Instructions for exercises provided prior visit.    Assessment     Poornima presents to second healthy back visit reporting neck pain, was able to demo HEP with Mod VC for form. Pt was able to tolerate Medical MedX machine well as follows:  MedX testing performed and patient tolerated test well.  Pt was also able to complete half of the peripheral strengthening exercises without increased discomfort and will complete the complete circuit next visit as tolerated.    Patient is making good progress towards established goals.  Pt will continue to benefit from skilled outpatient physical therapy to address the deficits stated in the impairment chart, provide pt/family education and to maximize pt's level of independence in the home and community  environment.     Anticipated Barriers for therapy: None  Pt's spiritual, cultural and educational needs considered and pt agreeable to plan of care and goals as stated below:     Goals:   GOALS: Pt is in agreement with the following goals.     Short term goals: 6 weeks or 10 visits   - Pt will demonstratte increased cervical MedX ROM as measured by med ex by 5 degrees from initial test which results in improved  ROM of neck for ease with ADLs and driving. Appropriate and Ongoing  - Pt will demonstrate independence with reducing or controlling symptoms with ther ex, movement, or position independently, able to reduce pain 1-2 points on pain scale using strategies taught in therapy.  Appropriate and Ongoing  - Pt will demonstrate increased MedX average isometric strength value by 10% with  when compared to the initial testing resulting in improved ability to perform bending, lifting, and carrying activities safely and confidently. Appropriate and Ongoing     Long term goals: 10 weeks or 20 visits  - Pt will demonstratte increased cervical MedX ROM as measured by med ex by 10 degrees from initial test which results in functional ROM of neck for ease with ADLs and driving.  Appropriate and Ongoing  - Pt will demonstrate increased MedX average isometric strength value  by 20% from initial test to improve ability to lift and carry, and sustain good posture while performing ADL's. Appropriate and Ongoing  - Pt will demonstrate reduced pain and improved functional outcomes as reported on the FOTO by reaching an intake score of >/= 68% functional ability in order to demonstrate subjective improvement in patient's condition. . Appropriate and Ongoing  - Pt will demonstrate independence with reducing or controlling symptoms with ther ex, movement, or position independently, able to reduce pain 2-4 points on pain scale using strategies taught in therapy. Appropriate and Ongoing  - Pt will demonstrate independence with the HEP  at discharge. Appropriate and Ongoing    Plan     Continue with established Plan of Care towards established PT goals.     Therapist: Akhil Locke, PT  3/25/2024

## 2024-04-05 ENCOUNTER — CLINICAL SUPPORT (OUTPATIENT)
Dept: REHABILITATION | Facility: HOSPITAL | Age: 29
End: 2024-04-05
Payer: COMMERCIAL

## 2024-04-05 DIAGNOSIS — G89.29 CHRONIC NECK PAIN: Primary | ICD-10-CM

## 2024-04-05 DIAGNOSIS — M54.2 CHRONIC NECK PAIN: Primary | ICD-10-CM

## 2024-04-05 PROCEDURE — 97112 NEUROMUSCULAR REEDUCATION: CPT | Mod: PN

## 2024-04-05 PROCEDURE — 97110 THERAPEUTIC EXERCISES: CPT | Mod: PN

## 2024-04-05 NOTE — PROGRESS NOTES
[facility]  Physical Therapy Treatment Note     Name: Poornima Anaya  Clinic Number: [id number]    Therapy Diagnosis:   Encounter Diagnosis   Name Primary?    Chronic neck pain Yes       Physician: Luigi Escalona,*    Visit Date: 4/5/2024    Physician Orders: PT Eval and Treat  Medical Diagnosis from Referral: Chronic neck pain  Evaluation Date: 3/4/2024  Authorization Period Expiration: 12/31/2024  Plan of Care Expiration: 5/3/2024                  Progress Update: 4/3/2024     Visit # / Visits authorized: 1 / 1 6 /20    FOTO: 3/25/2024 - Scored: 2 / 3      MedX Testing:MedX testing visit 2     Time In: 1403  Time Out: 1453  Total Billable Time: 50 minutes  INSURANCE and OUTCOMES: Fee for Service with FOTO Outcomes 1/3     Precautions: standard     Pattern of pain determined:    Subjective     Poornima Anaya reports soreness     Patient reports tolerating previous visit well  Patient reports their pain to be 3/10 on a 0-10 scale with 0 being no pain and 10 being the worst pain imaginable.  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 and function     Objective      Cervical  Isometric Testing on Med X equipment: Testing administered by PT    Test Initial Baseline Midpoint Final   Date 3/8/2024     ROM 54 deg     Max Peak Torque 74      Min Peak Torque 60      Flex/Ext Ratio      % below normative data -63     % gain from initial test Not available visit 1         Outcomes:  Intake Score: 56%  Visit 6 Score:   Visit 10 Score:   Discharge Score:  Goal Score: 68%     Treatment     Poornima received the treatments listed below:          4/5/2024    10:57 PM   [facility]   Visit Number 6   VAS Pain Rating 2   Cervical Weight 72 lbs   Repetitions 20   Rating of Perceived Exertion 3          Medical MedX Treatment as follows:  MedX testing performed day 2: Patient  received neuromuscular  education to engage spinal musculature correctly for motor control and engagement of musculature for 15 minutes including the MedX exercise component and practice and standard testing. MedX dynamic exercise and baseline isometric test performed with instructions to guide the patient safely through the testing procedure. Patient instructed to perform isometric test correctly and safely while building to an optimal force with a pain-free effort. Patient also instructed that they should feel support/pressure from MedX restraints but no pain/discomfort, and encouraged to report any pain to therapist. Patient demonstrated appropriate understanding of information and tolerance of test.  Education regarding purpose of test, safety during test given, and reviewed possible more soreness and strategies.       Poornima participated in therapeutic exercises to develop strength, ROM, posture, and core stabilization for 10 minutes including:       Activity   Details    UBE x     Upper trap stretch      Doorway stretch x                            Peripheral muscle strengthening which included one set of 15-20 repetitions at a slow and controlled 10-13 second per rep pace focused on strengthening supporting musculature in order to improve body mechanics and functional mobility. Patient and therapist focused on proper form during treatment to ensure optimal strengthening of each targeted muscle group.  Machines utilized included:Chest Press and Rowing  To be added next visit:      Poornima participated in neuromuscular re-education activities to improve balance, coordination, proprioception, motor control and/or posture for 45 minutes. The following activities were included:  Chin tuck  Supine cervical protraction  Prone cervical retraction  Prone T     Activity   Details    MedX cervical x See flowsheet    MedX rotation x 20# 20 (B)    Matrix rows x 25# 20    Matrix chest press x 10# 20    Cable shoulder extension x 20# 3x10     Shoulder flexion in scaption x 2# 20    Shoulder lateral raises to 90 x 2# 20    Shrugs x 3x10    Supine cervical protraction x 3x10    Open books x 2x10    Prone T on ball x 3x10    Prone W on ball x 3x10    Prone cervical retraction x 3x10                 Poornima participated in dynamic functional therapeutic activities to improve functional performance and simulate household and community activities for ---  minutes. The following activities were included:       Activity   Details                                             Poornima received manual therapy techniques for ---  minutes. The following activities were included:          Patient Education and Home Exercises     Home exercises include:  Chin tuck  Upper trap stretch    Cardio program (V5): -  Lifting education (V11): -  Fridge Magnet Discharge handout (date given): -  Equipment at home/gym membership:     Education provided:   - PT role and POC  - HEP    Written Home Exercises Provided: Patient instructed to cont prior HEP.  Exercises were reviewed and Poornima was able to demonstrate them prior to the end of the session.  Poornima demonstrated good  understanding of the education provided.     See EMR under Patient Instructions for exercises provided prior visit.    Assessment     Poornima presents to second healthy back visit reporting neck pain, was able to demo HEP with Mod VC for form. Pt was able to tolerate Medical MedX machine well as follows:  MedX testing performed and patient tolerated test well.  Pt was also able to complete half of the peripheral strengthening exercises without increased discomfort and will complete the complete circuit next visit as tolerated.    Patient is making good progress towards established goals.  Pt will continue to benefit from skilled outpatient physical therapy to address the deficits stated in the impairment chart, provide pt/family education and to maximize pt's level of independence in the home and community  environment.     Anticipated Barriers for therapy: None  Pt's spiritual, cultural and educational needs considered and pt agreeable to plan of care and goals as stated below:     Goals:   GOALS: Pt is in agreement with the following goals.     Short term goals: 6 weeks or 10 visits   - Pt will demonstratte increased cervical MedX ROM as measured by med ex by 5 degrees from initial test which results in improved  ROM of neck for ease with ADLs and driving. Appropriate and Ongoing  - Pt will demonstrate independence with reducing or controlling symptoms with ther ex, movement, or position independently, able to reduce pain 1-2 points on pain scale using strategies taught in therapy.  Appropriate and Ongoing  - Pt will demonstrate increased MedX average isometric strength value by 10% with  when compared to the initial testing resulting in improved ability to perform bending, lifting, and carrying activities safely and confidently. Appropriate and Ongoing     Long term goals: 10 weeks or 20 visits  - Pt will demonstratte increased cervical MedX ROM as measured by med ex by 10 degrees from initial test which results in functional ROM of neck for ease with ADLs and driving.  Appropriate and Ongoing  - Pt will demonstrate increased MedX average isometric strength value  by 20% from initial test to improve ability to lift and carry, and sustain good posture while performing ADL's. Appropriate and Ongoing  - Pt will demonstrate reduced pain and improved functional outcomes as reported on the FOTO by reaching an intake score of >/= 68% functional ability in order to demonstrate subjective improvement in patient's condition. . Appropriate and Ongoing  - Pt will demonstrate independence with reducing or controlling symptoms with ther ex, movement, or position independently, able to reduce pain 2-4 points on pain scale using strategies taught in therapy. Appropriate and Ongoing  - Pt will demonstrate independence with the HEP  at discharge. Appropriate and Ongoing    Plan     Continue with established Plan of Care towards established PT goals.     Therapist: Akhil Locke, PT  4/6/2024

## 2024-04-15 ENCOUNTER — CLINICAL SUPPORT (OUTPATIENT)
Dept: REHABILITATION | Facility: HOSPITAL | Age: 29
End: 2024-04-15
Payer: COMMERCIAL

## 2024-04-15 DIAGNOSIS — M54.2 CHRONIC NECK PAIN: Primary | ICD-10-CM

## 2024-04-15 DIAGNOSIS — G89.29 CHRONIC NECK PAIN: Primary | ICD-10-CM

## 2024-04-15 PROCEDURE — 97110 THERAPEUTIC EXERCISES: CPT | Mod: PN

## 2024-04-15 PROCEDURE — 97112 NEUROMUSCULAR REEDUCATION: CPT | Mod: PN

## 2024-04-15 NOTE — PROGRESS NOTES
[facility]  Physical Therapy Treatment Note     Name: Poornima Anaya  Clinic Number: [id number]    Therapy Diagnosis:   Encounter Diagnosis   Name Primary?    Chronic neck pain Yes         Physician: Luigi Escalona,*    Visit Date: 4/15/2024    Physician Orders: PT Eval and Treat  Medical Diagnosis from Referral: Chronic neck pain  Evaluation Date: 3/4/2024  Authorization Period Expiration: 12/31/2024  Plan of Care Expiration: 5/3/2024                  Progress Update: 4/3/2024     Visit # / Visits authorized: 1 / 1 7 /20    FOTO: 3/25/2024 - Scored: 2 / 3      MedX Testing:MedX testing visit 2     Time In: 1403  Time Out: 1453  Total Billable Time: 50 minutes  INSURANCE and OUTCOMES: Fee for Service with FOTO Outcomes 1/3     Precautions: standard     Pattern of pain determined:    Subjective     Poornima Anaya reports soreness     Patient reports tolerating previous visit well  Patient reports their pain to be 3/10 on a 0-10 scale with 0 being no pain and 10 being the worst pain imaginable.  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 and function     Objective      Cervical  Isometric Testing on Med X equipment: Testing administered by PT    Test Initial Baseline Midpoint Final   Date 3/8/2024     ROM 54 deg     Max Peak Torque 74      Min Peak Torque 60      Flex/Ext Ratio      % below normative data -63     % gain from initial test Not available visit 1         Outcomes:  Intake Score: 56%  Visit 6 Score:   Visit 10 Score:   Discharge Score:  Goal Score: 68%     Treatment     Poornima received the treatments listed below:          4/15/2024     9:52 PM   [facility]   Visit Number 7   VAS Pain Rating 2   Cervical Weight 72 lbs   Repetitions 20   Rating of Perceived Exertion 3           Medical MedX Treatment as follows:  MedX testing performed day 2: Patient  received neuromuscular  education to engage spinal musculature correctly for motor control and engagement of musculature for 15 minutes including the MedX exercise component and practice and standard testing. MedX dynamic exercise and baseline isometric test performed with instructions to guide the patient safely through the testing procedure. Patient instructed to perform isometric test correctly and safely while building to an optimal force with a pain-free effort. Patient also instructed that they should feel support/pressure from MedX restraints but no pain/discomfort, and encouraged to report any pain to therapist. Patient demonstrated appropriate understanding of information and tolerance of test.  Education regarding purpose of test, safety during test given, and reviewed possible more soreness and strategies.       Poornima participated in therapeutic exercises to develop strength, ROM, posture, and core stabilization for 10 minutes including:       Activity   Details    UBE x     Upper trap stretch      Doorway stretch x                            Peripheral muscle strengthening which included one set of 15-20 repetitions at a slow and controlled 10-13 second per rep pace focused on strengthening supporting musculature in order to improve body mechanics and functional mobility. Patient and therapist focused on proper form during treatment to ensure optimal strengthening of each targeted muscle group.  Machines utilized included:Chest Press and Rowing  To be added next visit:      Poornima participated in neuromuscular re-education activities to improve balance, coordination, proprioception, motor control and/or posture for 45 minutes. The following activities were included:  Chin tuck  Supine cervical protraction  Prone cervical retraction  Prone T     Activity   Details    MedX cervical x See flowsheet    MedX rotation x 20# 20 (B)    Matrix rows x 25# 20    Matrix chest press x 10# 20    Cable shoulder extension x 20# 3x10     Shoulder flexion in scaption x 2# 20    Shoulder lateral raises to 90 x 2# 20    Shrugs x 3x10    Supine cervical protraction x 3x10    Open books x 2x10    Prone T on ball x 3x10    Prone W on ball x 3x10    Prone cervical retraction x 3x10                 Poornima participated in dynamic functional therapeutic activities to improve functional performance and simulate household and community activities for ---  minutes. The following activities were included:       Activity   Details                                             Poornima received manual therapy techniques for ---  minutes. The following activities were included:          Patient Education and Home Exercises     Home exercises include:  Chin tuck  Upper trap stretch    Cardio program (V5): -  Lifting education (V11): -  Fridge Magnet Discharge handout (date given): -  Equipment at home/gym membership:     Education provided:   - PT role and POC  - HEP    Written Home Exercises Provided: Patient instructed to cont prior HEP.  Exercises were reviewed and Poornima was able to demonstrate them prior to the end of the session.  Poornima demonstrated good  understanding of the education provided.     See EMR under Patient Instructions for exercises provided prior visit.    Assessment     Poornmia presents to second healthy back visit reporting neck pain, was able to demo HEP with Mod VC for form. Pt was able to tolerate Medical MedX machine well as follows:  MedX testing performed and patient tolerated test well.  Pt was also able to complete half of the peripheral strengthening exercises without increased discomfort and will complete the complete circuit next visit as tolerated.    Patient is making good progress towards established goals.  Pt will continue to benefit from skilled outpatient physical therapy to address the deficits stated in the impairment chart, provide pt/family education and to maximize pt's level of independence in the home and community  environment.     Anticipated Barriers for therapy: None  Pt's spiritual, cultural and educational needs considered and pt agreeable to plan of care and goals as stated below:     Goals:   GOALS: Pt is in agreement with the following goals.     Short term goals: 6 weeks or 10 visits   - Pt will demonstratte increased cervical MedX ROM as measured by med ex by 5 degrees from initial test which results in improved  ROM of neck for ease with ADLs and driving. Appropriate and Ongoing  - Pt will demonstrate independence with reducing or controlling symptoms with ther ex, movement, or position independently, able to reduce pain 1-2 points on pain scale using strategies taught in therapy.  Appropriate and Ongoing  - Pt will demonstrate increased MedX average isometric strength value by 10% with  when compared to the initial testing resulting in improved ability to perform bending, lifting, and carrying activities safely and confidently. Appropriate and Ongoing     Long term goals: 10 weeks or 20 visits  - Pt will demonstratte increased cervical MedX ROM as measured by med ex by 10 degrees from initial test which results in functional ROM of neck for ease with ADLs and driving.  Appropriate and Ongoing  - Pt will demonstrate increased MedX average isometric strength value  by 20% from initial test to improve ability to lift and carry, and sustain good posture while performing ADL's. Appropriate and Ongoing  - Pt will demonstrate reduced pain and improved functional outcomes as reported on the FOTO by reaching an intake score of >/= 68% functional ability in order to demonstrate subjective improvement in patient's condition. . Appropriate and Ongoing  - Pt will demonstrate independence with reducing or controlling symptoms with ther ex, movement, or position independently, able to reduce pain 2-4 points on pain scale using strategies taught in therapy. Appropriate and Ongoing  - Pt will demonstrate independence with the HEP  at discharge. Appropriate and Ongoing    Plan     Continue with established Plan of Care towards established PT goals.     Therapist: Akhil Locke, PT  4/17/2024

## 2024-04-18 ENCOUNTER — CLINICAL SUPPORT (OUTPATIENT)
Dept: REHABILITATION | Facility: HOSPITAL | Age: 29
End: 2024-04-18
Payer: COMMERCIAL

## 2024-04-18 DIAGNOSIS — G89.29 CHRONIC NECK PAIN: Primary | ICD-10-CM

## 2024-04-18 DIAGNOSIS — M54.2 CHRONIC NECK PAIN: Primary | ICD-10-CM

## 2024-04-18 PROCEDURE — 97112 NEUROMUSCULAR REEDUCATION: CPT | Mod: PN

## 2024-04-22 NOTE — PROGRESS NOTES
[facility]  Physical Therapy Treatment Note     Name: Poornima Anaya  Clinic Number: [id number]    Therapy Diagnosis:   Encounter Diagnosis   Name Primary?    Chronic neck pain Yes         Physician: Luigi Escalona,*    Visit Date: 4/18/2024    Physician Orders: PT Eval and Treat  Medical Diagnosis from Referral: Chronic neck pain  Evaluation Date: 3/4/2024  Authorization Period Expiration: 12/31/2024  Plan of Care Expiration: 5/3/2024                  Progress Update:5/3/2024     Visit # / Visits authorized: 1 / 1 8 /20    FOTO: 3/25/2024 - Scored: 2 / 3      MedX Testing:MedX testing visit 2     Time In: 1400  Time Out: 1453  Total Billable Time: 53 minutes  INSURANCE and OUTCOMES: Fee for Service with FOTO Outcomes 1/3     Precautions: standard     Pattern of pain determined:    Subjective     Poornima Anaya she is doing ok    Patient reports tolerating previous visit well  Patient reports their pain to be 3/10 on a 0-10 scale with 0 being no pain and 10 being the worst pain imaginable.  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 and function     Objective      Cervical  Isometric Testing on Med X equipment: Testing administered by PT    Test Initial Baseline Midpoint Final   Date 3/8/2024     ROM 54 deg     Max Peak Torque 74      Min Peak Torque 60      Flex/Ext Ratio      % below normative data -63     % gain from initial test Not available visit 1         Outcomes:  Intake Score: 56%  Visit 6 Score:   Visit 10 Score:   Discharge Score:  Goal Score: 68%     Treatment     Poornima received the treatments listed below:          4/19/2024     7:59 PM   [facility]   Visit Number 8   VAS Pain Rating 2   Cervical Weight 72 lbs   Repetitions 20   Rating of Perceived Exertion 3          Medical MedX Treatment as follows:  MedX testing performed day 2: Patient  received neuromuscular  education to engage spinal musculature correctly for motor control and engagement of musculature for 15 minutes including the MedX exercise component and practice and standard testing. MedX dynamic exercise and baseline isometric test performed with instructions to guide the patient safely through the testing procedure. Patient instructed to perform isometric test correctly and safely while building to an optimal force with a pain-free effort. Patient also instructed that they should feel support/pressure from MedX restraints but no pain/discomfort, and encouraged to report any pain to therapist. Patient demonstrated appropriate understanding of information and tolerance of test.  Education regarding purpose of test, safety during test given, and reviewed possible more soreness and strategies.       Poornima participated in therapeutic exercises to develop strength, ROM, posture, and core stabilization for 10 minutes including:       Activity   Details    UBE x     Upper trap stretch      Doorway stretch x                            Peripheral muscle strengthening which included one set of 15-20 repetitions at a slow and controlled 10-13 second per rep pace focused on strengthening supporting musculature in order to improve body mechanics and functional mobility. Patient and therapist focused on proper form during treatment to ensure optimal strengthening of each targeted muscle group.  Machines utilized included:Chest Press and Rowing  To be added next visit:      Poornima participated in neuromuscular re-education activities to improve balance, coordination, proprioception, motor control and/or posture for 43 minutes. The following activities were included:  Chin tuck  Supine cervical protraction  Prone cervical retraction  Prone T     Activity   Details    MedX cervical x See flowsheet    MedX rotation x 20# 20 (B)    Matrix rows x 25# 20    Matrix chest press x 10# 20    Cable shoulder extension x 20# 3x10     Shoulder flexion in scaption x 2# 20    Shoulder lateral raises to 90 x 2# 20    Shrugs x 3x10    Supine cervical protraction x 3x10    Open books x 2x10    Prone T on ball x 3x10    Prone W on ball x 3x10    Prone cervical retraction x 3x10                 Poornima participated in dynamic functional therapeutic activities to improve functional performance and simulate household and community activities for ---  minutes. The following activities were included:       Activity   Details                                             Poornima received manual therapy techniques for ---  minutes. The following activities were included:          Patient Education and Home Exercises     Home exercises include:  Chin tuck  Upper trap stretch    Cardio program (V5): -  Lifting education (V11): -  Fridge Magnet Discharge handout (date given): -  Equipment at home/gym membership:     Education provided:   - PT role and POC  - HEP    Written Home Exercises Provided: Patient instructed to cont prior HEP.  Exercises were reviewed and Poornima was able to demonstrate them prior to the end of the session.  Poornima demonstrated good  understanding of the education provided.     See EMR under Patient Instructions for exercises provided prior visit.    Assessment     Poornima presents to second healthy back visit reporting neck pain, was able to demo HEP with Mod VC for form. Pt was able to tolerate Medical MedX machine well as follows:  MedX testing performed and patient tolerated test well.  Pt was also able to complete half of the peripheral strengthening exercises without increased discomfort and will complete the complete circuit next visit as tolerated.    Patient is making good progress towards established goals.  Pt will continue to benefit from skilled outpatient physical therapy to address the deficits stated in the impairment chart, provide pt/family education and to maximize pt's level of independence in the home and community  environment.     Anticipated Barriers for therapy: None  Pt's spiritual, cultural and educational needs considered and pt agreeable to plan of care and goals as stated below:     Goals:   GOALS: Pt is in agreement with the following goals.     Short term goals: 6 weeks or 10 visits   - Pt will demonstratte increased cervical MedX ROM as measured by med ex by 5 degrees from initial test which results in improved  ROM of neck for ease with ADLs and driving. Appropriate and Ongoing  - Pt will demonstrate independence with reducing or controlling symptoms with ther ex, movement, or position independently, able to reduce pain 1-2 points on pain scale using strategies taught in therapy.  Appropriate and Ongoing  - Pt will demonstrate increased MedX average isometric strength value by 10% with  when compared to the initial testing resulting in improved ability to perform bending, lifting, and carrying activities safely and confidently. Appropriate and Ongoing     Long term goals: 10 weeks or 20 visits  - Pt will demonstratte increased cervical MedX ROM as measured by med ex by 10 degrees from initial test which results in functional ROM of neck for ease with ADLs and driving.  Appropriate and Ongoing  - Pt will demonstrate increased MedX average isometric strength value  by 20% from initial test to improve ability to lift and carry, and sustain good posture while performing ADL's. Appropriate and Ongoing  - Pt will demonstrate reduced pain and improved functional outcomes as reported on the FOTO by reaching an intake score of >/= 68% functional ability in order to demonstrate subjective improvement in patient's condition. . Appropriate and Ongoing  - Pt will demonstrate independence with reducing or controlling symptoms with ther ex, movement, or position independently, able to reduce pain 2-4 points on pain scale using strategies taught in therapy. Appropriate and Ongoing  - Pt will demonstrate independence with the HEP  at discharge. Appropriate and Ongoing    Plan     Continue with established Plan of Care towards established PT goals.     Therapist: Akhil Locke, PT  4/21/2024

## 2024-04-26 ENCOUNTER — CLINICAL SUPPORT (OUTPATIENT)
Dept: REHABILITATION | Facility: HOSPITAL | Age: 29
End: 2024-04-26
Payer: COMMERCIAL

## 2024-04-26 DIAGNOSIS — G89.29 CHRONIC NECK PAIN: Primary | ICD-10-CM

## 2024-04-26 DIAGNOSIS — M54.2 CHRONIC NECK PAIN: Primary | ICD-10-CM

## 2024-04-26 PROCEDURE — 97112 NEUROMUSCULAR REEDUCATION: CPT | Mod: PN

## 2024-04-26 PROCEDURE — 97110 THERAPEUTIC EXERCISES: CPT | Mod: PN

## 2024-04-26 NOTE — PROGRESS NOTES
[facility]  Physical Therapy Treatment Note     Name: Poornima Anaya  Clinic Number: [id number]    Therapy Diagnosis:   Encounter Diagnosis   Name Primary?    Chronic neck pain Yes     Physician: Luigi Escalona,*    Visit Date: 4/26/2024    Physician Orders: PT Eval and Treat  Medical Diagnosis from Referral: Chronic neck pain  Evaluation Date: 3/4/2024  Authorization Period Expiration: 12/31/2024  Plan of Care Expiration: 5/3/2024                  Progress Update:5/3/2024     Visit # / Visits authorized: 1 / 1 9 /20    FOTO: 3/25/2024 - Scored: 2 / 3      MedX Testing:MedX testing visit 2     Time In: 0935  Time Out: 1030  Total Billable Time: 55 minutes  INSURANCE and OUTCOMES: Fee for Service with FOTO Outcomes 1/3     Precautions: standard     Pattern of pain determined:    Subjective     Poornima Anaya she is doing ok    Patient reports tolerating previous visit well  Patient reports their pain to be 3/10 on a 0-10 scale with 0 being no pain and 10 being the worst pain imaginable.  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 and function     Objective      Cervical  Isometric Testing on Med X equipment: Testing administered by PT    Test Initial Baseline Midpoint Final   Date 3/8/2024     ROM 54 deg     Max Peak Torque 74      Min Peak Torque 60      Flex/Ext Ratio      % below normative data -63     % gain from initial test Not available visit 1         Outcomes:  Intake Score: 56%  Visit 6 Score: 56%  Visit 10 Score:   Discharge Score:  Goal Score: 68%     Treatment     Poornima received the treatments listed below:          4/26/2024    12:01 PM   [facility]   Visit Number 9   VAS Pain Rating 2   Cervical Weight 80 lbs   Repetitions 20   Rating of Perceived Exertion 2           Medical MedX Treatment as follows:  MedX testing performed day 2: Patient  received neuromuscular  education to engage spinal musculature correctly for motor control and engagement of musculature for 15 minutes including the MedX exercise component and practice and standard testing. MedX dynamic exercise and baseline isometric test performed with instructions to guide the patient safely through the testing procedure. Patient instructed to perform isometric test correctly and safely while building to an optimal force with a pain-free effort. Patient also instructed that they should feel support/pressure from MedX restraints but no pain/discomfort, and encouraged to report any pain to therapist. Patient demonstrated appropriate understanding of information and tolerance of test.  Education regarding purpose of test, safety during test given, and reviewed possible more soreness and strategies.       Poornima participated in therapeutic exercises to develop strength, ROM, posture, and core stabilization for 10 minutes including:       Activity   Details    UBE x     Upper trap stretch      Doorway stretch x                            Peripheral muscle strengthening which included one set of 15-20 repetitions at a slow and controlled 10-13 second per rep pace focused on strengthening supporting musculature in order to improve body mechanics and functional mobility. Patient and therapist focused on proper form during treatment to ensure optimal strengthening of each targeted muscle group.  Machines utilized included:Chest Press and Rowing  To be added next visit:      Poornima participated in neuromuscular re-education activities to improve balance, coordination, proprioception, motor control and/or posture for 45 minutes. The following activities were included:  Chin tuck  Supine cervical protraction  Prone cervical retraction  Prone T     Activity   Details    MedX cervical x See flowsheet    MedX rotation x 20# 20 (B)    Matrix rows x 25# 20    Matrix chest press x 10# 20    Cable shoulder extension x 20# 3x10     Shoulder flexion in scaption x 2# 20    Shoulder lateral raises to 90 x 2# 20    Shrugs x 3x10    Supine cervical protraction x 3x10    Open books x 2x10    Prone T on ball x 3x10    Prone W on ball x 3x10    Prone cervical retraction x 3x10                 Poornima participated in dynamic functional therapeutic activities to improve functional performance and simulate household and community activities for ---  minutes. The following activities were included:       Activity   Details                                             Poornima received manual therapy techniques for ---  minutes. The following activities were included:          Patient Education and Home Exercises     Home exercises include:  Chin tuck  Upper trap stretch    Cardio program (V5): -  Lifting education (V11): -  Fridge Magnet Discharge handout (date given): -  Equipment at home/gym membership:     Education provided:   - PT role and POC  - HEP    Written Home Exercises Provided: Patient instructed to cont prior HEP.  Exercises were reviewed and Poornima was able to demonstrate them prior to the end of the session.  Poornima demonstrated good  understanding of the education provided.     See EMR under Patient Instructions for exercises provided prior visit.    Assessment     Poornima presents to second healthy back visit reporting neck pain, was able to demo HEP with Mod VC for form. Pt was able to tolerate Medical MedX machine well as follows:  MedX testing performed and patient tolerated test well.  Pt was also able to complete half of the peripheral strengthening exercises without increased discomfort and will complete the complete circuit next visit as tolerated.    Patient is making good progress towards established goals.  Pt will continue to benefit from skilled outpatient physical therapy to address the deficits stated in the impairment chart, provide pt/family education and to maximize pt's level of independence in the home and community  environment.     Anticipated Barriers for therapy: None  Pt's spiritual, cultural and educational needs considered and pt agreeable to plan of care and goals as stated below:     Goals:   GOALS: Pt is in agreement with the following goals.     Short term goals: 6 weeks or 10 visits   - Pt will demonstratte increased cervical MedX ROM as measured by med ex by 5 degrees from initial test which results in improved  ROM of neck for ease with ADLs and driving. Appropriate and Ongoing  - Pt will demonstrate independence with reducing or controlling symptoms with ther ex, movement, or position independently, able to reduce pain 1-2 points on pain scale using strategies taught in therapy.  Appropriate and Ongoing  - Pt will demonstrate increased MedX average isometric strength value by 10% with  when compared to the initial testing resulting in improved ability to perform bending, lifting, and carrying activities safely and confidently. Appropriate and Ongoing     Long term goals: 10 weeks or 20 visits  - Pt will demonstratte increased cervical MedX ROM as measured by med ex by 10 degrees from initial test which results in functional ROM of neck for ease with ADLs and driving.  Appropriate and Ongoing  - Pt will demonstrate increased MedX average isometric strength value  by 20% from initial test to improve ability to lift and carry, and sustain good posture while performing ADL's. Appropriate and Ongoing  - Pt will demonstrate reduced pain and improved functional outcomes as reported on the FOTO by reaching an intake score of >/= 68% functional ability in order to demonstrate subjective improvement in patient's condition. . Appropriate and Ongoing  - Pt will demonstrate independence with reducing or controlling symptoms with ther ex, movement, or position independently, able to reduce pain 2-4 points on pain scale using strategies taught in therapy. Appropriate and Ongoing  - Pt will demonstrate independence with the HEP  at discharge. Appropriate and Ongoing    Plan     Continue with established Plan of Care towards established PT goals.     Therapist: Akhil Locke, PT  4/28/2024

## 2024-04-29 ENCOUNTER — CLINICAL SUPPORT (OUTPATIENT)
Dept: REHABILITATION | Facility: HOSPITAL | Age: 29
End: 2024-04-29
Payer: COMMERCIAL

## 2024-04-29 DIAGNOSIS — M54.2 CHRONIC NECK PAIN: Primary | ICD-10-CM

## 2024-04-29 DIAGNOSIS — G89.29 CHRONIC NECK PAIN: Primary | ICD-10-CM

## 2024-04-29 PROCEDURE — 97110 THERAPEUTIC EXERCISES: CPT | Mod: PN

## 2024-04-29 PROCEDURE — 97112 NEUROMUSCULAR REEDUCATION: CPT | Mod: PN

## 2024-04-29 NOTE — PROGRESS NOTES
[facility]  Physical Therapy Treatment Note     Name: Poornima Anaya  Clinic Number: [id number]    Therapy Diagnosis:   Encounter Diagnosis   Name Primary?    Chronic neck pain Yes       Physician: Luigi Escalona,*    Visit Date: 4/29/2024    Physician Orders: PT Eval and Treat  Medical Diagnosis from Referral: Chronic neck pain  Evaluation Date: 3/4/2024  Authorization Period Expiration: 12/31/2024  Plan of Care Expiration: 5/3/2024                  Progress Update:5/3/2024     Visit # / Visits authorized: 1 / 1   10 /20    FOTO: 3/25/2024 - Scored: 2 / 3      MedX Testing:MedX testing visit 2     Time In: 1406  Time Out: 1456  Total Billable Time: 53 minutes  INSURANCE and OUTCOMES: Fee for Service with FOTO Outcomes 1/3     Precautions: standard     Pattern of pain determined:    Subjective     Poornima Anaya she is doing ok    Patient reports tolerating previous visit well  Patient reports their pain to be 3/10 on a 0-10 scale with 0 being no pain and 10 being the worst pain imaginable.  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 and function     Objective      Cervical  Isometric Testing on Med X equipment: Testing administered by PT    Test Initial Baseline Midpoint Final   Date 3/8/2024     ROM 54 deg     Max Peak Torque 74      Min Peak Torque 60      Flex/Ext Ratio      % below normative data -63     % gain from initial test Not available visit 1         Outcomes:  Intake Score: 56%  Visit 6 Score: 56%  Visit 10 Score:   Discharge Score:  Goal Score: 68%     Treatment     Poornima received the treatments listed below:          4/29/2024     8:03 PM   [facility]   Visit Number 10   VAS Pain Rating 3   Cervical Weight 80 lbs   Repetitions 20   Rating of Perceived Exertion 2             Medical MedX Treatment as follows:  MedX testing performed day 2: Patient  received  neuromuscular education to engage spinal musculature correctly for motor control and engagement of musculature for 15 minutes including the MedX exercise component and practice and standard testing. MedX dynamic exercise and baseline isometric test performed with instructions to guide the patient safely through the testing procedure. Patient instructed to perform isometric test correctly and safely while building to an optimal force with a pain-free effort. Patient also instructed that they should feel support/pressure from MedX restraints but no pain/discomfort, and encouraged to report any pain to therapist. Patient demonstrated appropriate understanding of information and tolerance of test.  Education regarding purpose of test, safety during test given, and reviewed possible more soreness and strategies.       Poornima participated in therapeutic exercises to develop strength, ROM, posture, and core stabilization for 10 minutes including:       Activity   Details    UBE x     Upper trap stretch      Doorway stretch x                            Peripheral muscle strengthening which included one set of 15-20 repetitions at a slow and controlled 10-13 second per rep pace focused on strengthening supporting musculature in order to improve body mechanics and functional mobility. Patient and therapist focused on proper form during treatment to ensure optimal strengthening of each targeted muscle group.  Machines utilized included:Chest Press and Rowing  To be added next visit:      Poornima participated in neuromuscular re-education activities to improve balance, coordination, proprioception, motor control and/or posture for 43 minutes. The following activities were included:  Chin tuck  Supine cervical protraction  Prone cervical retraction  Prone T     Activity   Details    MedX cervical x See flowsheet    MedX rotation x 20# 20 (B)    Matrix rows x 25# 20    Matrix chest press x 10# 20    Cable shoulder extension x  20# 3x10    Shoulder flexion in scaption x 2# 20    Shoulder lateral raises to 90 x 2# 20    Shrugs x 3x10    Supine cervical protraction x 3x10    Open books x 2x10    Prone T on ball x 3x10    Prone W on ball x 3x10    Prone cervical retraction x 3x10                 Poornima participated in dynamic functional therapeutic activities to improve functional performance and simulate household and community activities for ---  minutes. The following activities were included:       Activity   Details                                             Poornima received manual therapy techniques for ---  minutes. The following activities were included:          Patient Education and Home Exercises     Home exercises include:  Chin tuck  Upper trap stretch    Cardio program (V5): -  Lifting education (V11): -  Fridge Magnet Discharge handout (date given): -  Equipment at home/gym membership:     Education provided:   - PT role and POC  - HEP    Written Home Exercises Provided: Patient instructed to cont prior HEP.  Exercises were reviewed and Poornima was able to demonstrate them prior to the end of the session.  Poornima demonstrated good  understanding of the education provided.     See EMR under Patient Instructions for exercises provided prior visit.    Assessment     Poornima presents to second [facility] visit reporting neck pain, was able to demo HEP with Mod VC for form. Pt was able to tolerate Medical MedX machine well as follows:  MedX testing performed and patient tolerated test well.  Pt was also able to complete half of the peripheral strengthening exercises without increased discomfort and will complete the complete circuit next visit as tolerated.    Patient is making good progress towards established goals.  Pt will continue to benefit from skilled outpatient physical therapy to address the deficits stated in the impairment chart, provide pt/family education and to maximize pt's level of independence in the home and  community environment.     Anticipated Barriers for therapy: None  Pt's spiritual, cultural and educational needs considered and pt agreeable to plan of care and goals as stated below:     Goals:   GOALS: Pt is in agreement with the following goals.     Short term goals: 6 weeks or 10 visits   - Pt will demonstratte increased cervical MedX ROM as measured by med ex by 5 degrees from initial test which results in improved  ROM of neck for ease with ADLs and driving. Appropriate and Ongoing  - Pt will demonstrate independence with reducing or controlling symptoms with ther ex, movement, or position independently, able to reduce pain 1-2 points on pain scale using strategies taught in therapy.  Appropriate and Ongoing  - Pt will demonstrate increased MedX average isometric strength value by 10% with  when compared to the initial testing resulting in improved ability to perform bending, lifting, and carrying activities safely and confidently. Appropriate and Ongoing     Long term goals: 10 weeks or 20 visits  - Pt will demonstratte increased cervical MedX ROM as measured by med ex by 10 degrees from initial test which results in functional ROM of neck for ease with ADLs and driving.  Appropriate and Ongoing  - Pt will demonstrate increased MedX average isometric strength value  by 20% from initial test to improve ability to lift and carry, and sustain good posture while performing ADL's. Appropriate and Ongoing  - Pt will demonstrate reduced pain and improved functional outcomes as reported on the FOTO by reaching an intake score of >/= 68% functional ability in order to demonstrate subjective improvement in patient's condition. . Appropriate and Ongoing  - Pt will demonstrate independence with reducing or controlling symptoms with ther ex, movement, or position independently, able to reduce pain 2-4 points on pain scale using strategies taught in therapy. Appropriate and Ongoing  - Pt will demonstrate independence with  the HEP at discharge. Appropriate and Ongoing    Plan     Continue with established Plan of Care towards established PT goals.     Therapist: Akhil Locke, PT  4/30/2024

## 2024-05-08 ENCOUNTER — PATIENT MESSAGE (OUTPATIENT)
Dept: PRIMARY CARE CLINIC | Facility: CLINIC | Age: 29
End: 2024-05-08
Payer: COMMERCIAL

## 2024-05-08 DIAGNOSIS — G43.009 MIGRAINE WITHOUT AURA AND WITHOUT STATUS MIGRAINOSUS, NOT INTRACTABLE: Primary | ICD-10-CM

## 2024-05-08 RX ORDER — PROPRANOLOL HYDROCHLORIDE 60 MG/1
60 CAPSULE, EXTENDED RELEASE ORAL DAILY
Qty: 90 CAPSULE | Refills: 1 | Status: SHIPPED | OUTPATIENT
Start: 2024-05-08 | End: 2024-11-04

## 2024-05-10 ENCOUNTER — CLINICAL SUPPORT (OUTPATIENT)
Dept: REHABILITATION | Facility: HOSPITAL | Age: 29
End: 2024-05-10
Payer: COMMERCIAL

## 2024-05-10 DIAGNOSIS — M54.2 CHRONIC NECK PAIN: Primary | ICD-10-CM

## 2024-05-10 DIAGNOSIS — G89.29 CHRONIC NECK PAIN: Primary | ICD-10-CM

## 2024-05-10 PROCEDURE — 97110 THERAPEUTIC EXERCISES: CPT | Mod: PN

## 2024-05-10 PROCEDURE — 97112 NEUROMUSCULAR REEDUCATION: CPT | Mod: PN

## 2024-05-12 NOTE — PROGRESS NOTES
[facility]  Physical Therapy Treatment Note     Name: Poornima Anaya  Clinic Number: [id number]    Therapy Diagnosis:   Encounter Diagnosis   Name Primary?    Chronic neck pain Yes       Physician: Luigi Escalona,*    Visit Date: 5/10/2024    Physician Orders: PT Eval and Treat  Medical Diagnosis from Referral: Chronic neck pain  Evaluation Date: 3/4/2024  Authorization Period Expiration: 12/31/2024     Visit # / Visits authorized: 1 / 1 11 /20    FOTO: 3/25/2024 - Scored: 2 / 3      MedX Testing:MedX testing visit 2     Time In: 1402  Time Out: 1455  Total Billable Time: 53 minutes  INSURANCE and OUTCOMES: Fee for Service with FOTO Outcomes 1/3     Precautions: standard     Pattern of pain determined:    Subjective     Poorinma Anaya she is doing ok    Patient reports tolerating previous visit well  Patient reports their pain to be 3/10 on a 0-10 scale with 0 being no pain and 10 being the worst pain imaginable.  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 and function     Objective      Cervical  Isometric Testing on Med X equipment: Testing administered by PT    Test Initial Baseline Midpoint Final   Date 3/8/2024     ROM 54 deg     Max Peak Torque 74      Min Peak Torque 60      Flex/Ext Ratio      % below normative data -63     % gain from initial test Not available visit 1         Outcomes:  Intake Score: 56%  Visit 6 Score: 56%  Visit 10 Score:   Discharge Score:  Goal Score: 68%     Treatment     Poornima received the treatments listed below:          4/29/2024     8:03 PM   [facility]   Visit Number 10   VAS Pain Rating 3   Cervical Weight 80 lbs   Repetitions 20   Rating of Perceived Exertion 2             Medical MedX Treatment as follows:  MedX testing performed day 2: Patient  received neuromuscular education to engage spinal musculature correctly for motor control and  engagement of musculature for 15 minutes including the MedX exercise component and practice and standard testing. MedX dynamic exercise and baseline isometric test performed with instructions to guide the patient safely through the testing procedure. Patient instructed to perform isometric test correctly and safely while building to an optimal force with a pain-free effort. Patient also instructed that they should feel support/pressure from MedX restraints but no pain/discomfort, and encouraged to report any pain to therapist. Patient demonstrated appropriate understanding of information and tolerance of test.  Education regarding purpose of test, safety during test given, and reviewed possible more soreness and strategies.       Poornima participated in therapeutic exercises to develop strength, ROM, posture, and core stabilization for 10 minutes including:       Activity   Details    UBE x     Upper trap stretch      Doorway stretch x                            Peripheral muscle strengthening which included one set of 15-20 repetitions at a slow and controlled 10-13 second per rep pace focused on strengthening supporting musculature in order to improve body mechanics and functional mobility. Patient and therapist focused on proper form during treatment to ensure optimal strengthening of each targeted muscle group.  Machines utilized included:Chest Press and Rowing  To be added next visit:      Poornima participated in neuromuscular re-education activities to improve balance, coordination, proprioception, motor control and/or posture for 43 minutes. The following activities were included:  Chin tuck  Supine cervical protraction  Prone cervical retraction  Prone T     Activity   Details    MedX cervical x See flowsheet    MedX rotation x 20# 20 (B)    Matrix rows x 25# 20    Matrix chest press x 10# 20    Cable shoulder extension x 20# 3x10    Shoulder flexion in scaption x 2# 20    Shoulder lateral raises to 90 x 2#  20    Shrugs x 3x10    Supine cervical protraction x 3x10    Open books x 2x10    Prone T on ball x 3x10    Prone W on ball x 3x10    Prone cervical retraction x 3x10                 Poornima participated in dynamic functional therapeutic activities to improve functional performance and simulate household and community activities for ---  minutes. The following activities were included:       Activity   Details                                             Poornima received manual therapy techniques for ---  minutes. The following activities were included:          Patient Education and Home Exercises     Home exercises include:  Chin tuck  Upper trap stretch    Cardio program (V5): -  Lifting education (V11): -  Fridge Magnet Discharge handout (date given): -  Equipment at home/gym membership:     Education provided:   - PT role and POC  - HEP    Written Home Exercises Provided: Patient instructed to cont prior HEP.  Exercises were reviewed and Poornima was able to demonstrate them prior to the end of the session.  Poornima demonstrated good  understanding of the education provided.     See EMR under Patient Instructions for exercises provided prior visit.    Assessment     Poornima presents to second [facility] visit reporting neck pain, was able to demo HEP with Mod VC for form. Pt was able to tolerate Medical MedX machine well as follows:  MedX testing performed and patient tolerated test well.  Pt was also able to complete half of the peripheral strengthening exercises without increased discomfort and will complete the complete circuit next visit as tolerated.    Patient is making good progress towards established goals.  Pt will continue to benefit from skilled outpatient physical therapy to address the deficits stated in the impairment chart, provide pt/family education and to maximize pt's level of independence in the home and community environment.     Anticipated Barriers for therapy: None  Pt's spiritual,  cultural and educational needs considered and pt agreeable to plan of care and goals as stated below:     Goals:   GOALS: Pt is in agreement with the following goals.     Short term goals: 6 weeks or 10 visits   - Pt will demonstratte increased cervical MedX ROM as measured by med ex by 5 degrees from initial test which results in improved  ROM of neck for ease with ADLs and driving. Appropriate and Ongoing  - Pt will demonstrate independence with reducing or controlling symptoms with ther ex, movement, or position independently, able to reduce pain 1-2 points on pain scale using strategies taught in therapy.  Appropriate and Ongoing  - Pt will demonstrate increased MedX average isometric strength value by 10% with  when compared to the initial testing resulting in improved ability to perform bending, lifting, and carrying activities safely and confidently. Appropriate and Ongoing     Long term goals: 10 weeks or 20 visits  - Pt will demonstratte increased cervical MedX ROM as measured by med ex by 10 degrees from initial test which results in functional ROM of neck for ease with ADLs and driving.  Appropriate and Ongoing  - Pt will demonstrate increased MedX average isometric strength value  by 20% from initial test to improve ability to lift and carry, and sustain good posture while performing ADL's. Appropriate and Ongoing  - Pt will demonstrate reduced pain and improved functional outcomes as reported on the FOTO by reaching an intake score of >/= 68% functional ability in order to demonstrate subjective improvement in patient's condition. . Appropriate and Ongoing  - Pt will demonstrate independence with reducing or controlling symptoms with ther ex, movement, or position independently, able to reduce pain 2-4 points on pain scale using strategies taught in therapy. Appropriate and Ongoing  - Pt will demonstrate independence with the HEP at discharge. Appropriate and Ongoing    Plan     Continue with  established Plan of Care towards established PT goals.     Therapist: Akhil Locke, PT  5/11/2024

## 2024-05-17 ENCOUNTER — CLINICAL SUPPORT (OUTPATIENT)
Dept: REHABILITATION | Facility: HOSPITAL | Age: 29
End: 2024-05-17
Payer: COMMERCIAL

## 2024-05-17 DIAGNOSIS — G89.29 CHRONIC NECK PAIN: Primary | ICD-10-CM

## 2024-05-17 DIAGNOSIS — M54.2 CHRONIC NECK PAIN: Primary | ICD-10-CM

## 2024-05-17 PROCEDURE — 97112 NEUROMUSCULAR REEDUCATION: CPT | Mod: PN

## 2024-05-17 PROCEDURE — 97012 MECHANICAL TRACTION THERAPY: CPT | Mod: PN

## 2024-05-17 NOTE — PROGRESS NOTES
[facility]  Physical Therapy Treatment Note     Name: Poornima Anaya  Clinic Number: [id number]    Therapy Diagnosis:   No diagnosis found.      Physician: Luigi Escalona,*    Visit Date: 5/17/2024    Physician Orders: PT Eval and Treat  Medical Diagnosis from Referral: Chronic neck pain  Evaluation Date: 3/4/2024  Authorization Period Expiration: 12/31/2024     Visit # / Visits authorized: 1 / 1 11 /20    FOTO: 3/25/2024 - Scored: 2 / 3      MedX Testing:MedX testing visit 2     Time In: 1402  Time Out: 1455  Total Billable Time: 53 minutes  INSURANCE and OUTCOMES: Fee for Service with FOTO Outcomes 1/3     Precautions: standard     Pattern of pain determined:    Subjective     Poornima Anaya she is doing ok    Patient reports tolerating previous visit well  Patient reports their pain to be 3/10 on a 0-10 scale with 0 being no pain and 10 being the worst pain imaginable.  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 and function     Objective      Cervical  Isometric Testing on Med X equipment: Testing administered by PT    Test Initial Baseline Midpoint Final   Date 3/8/2024     ROM 54 deg     Max Peak Torque 74      Min Peak Torque 60      Flex/Ext Ratio      % below normative data -63     % gain from initial test Not available visit 1         Outcomes:  Intake Score: 56%  Visit 6 Score: 56%  Visit 10 Score:   Discharge Score:  Goal Score: 68%     Treatment     Poornima received the treatments listed below:          4/29/2024     8:03 PM   [facility]   Visit Number 10   VAS Pain Rating 3   Cervical Weight 80 lbs   Repetitions 20   Rating of Perceived Exertion 2             Medical MedX Treatment as follows:  MedX testing performed day 2: Patient  received neuromuscular education to engage spinal musculature correctly for motor control and engagement of musculature for 15 minutes  including the MedX exercise component and practice and standard testing. MedX dynamic exercise and baseline isometric test performed with instructions to guide the patient safely through the testing procedure. Patient instructed to perform isometric test correctly and safely while building to an optimal force with a pain-free effort. Patient also instructed that they should feel support/pressure from MedX restraints but no pain/discomfort, and encouraged to report any pain to therapist. Patient demonstrated appropriate understanding of information and tolerance of test.  Education regarding purpose of test, safety during test given, and reviewed possible more soreness and strategies.       Poornima participated in therapeutic exercises to develop strength, ROM, posture, and core stabilization for --- minutes including:       Activity   Details    UBE x     Upper trap stretch      Doorway stretch                             Peripheral muscle strengthening which included one set of 15-20 repetitions at a slow and controlled 10-13 second per rep pace focused on strengthening supporting musculature in order to improve body mechanics and functional mobility. Patient and therapist focused on proper form during treatment to ensure optimal strengthening of each targeted muscle group.  Machines utilized included:Chest Press and Rowing  To be added next visit:      Poornima participated in neuromuscular re-education activities to improve balance, coordination, proprioception, motor control and/or posture for 45 minutes. The following activities were included:     Activity   Details    MedX cervical x See flowsheet    MedX rotation x 20# 20 (B)    Matrix rows x 25# 20    Matrix chest press x 10# 20    Cable shoulder extension x 20# 3x10    Shoulder flexion in scaption x 2# 20    Shoulder lateral raises to 90 x 2# 20    Shrugs x 3x10    Supine cervical protraction x 3x10    Open books x 2x10    Prone T on ball x 3x10    Prone W  on ball x 3x10    Prone cervical retraction x 3x10                 Poornima participated in dynamic functional therapeutic activities to improve functional performance and simulate household and community activities for ---  minutes. The following activities were included:       Activity   Details                                             Poornima received manual therapy techniques for ---  minutes. The following activities were included:    Supervised modalities after being cleared for contradictions: Mechanical Traction:  Poornima received intermittent mechanical traction to the cervical spine at a force of 24 pounds for a total of 15 minutes. Hold time of 30 seconds and rest time for 10 seconds. Patient tolerated treatment well without any adverse effects.       Patient Education and Home Exercises     Home exercises include:  Chin tuck  Upper trap stretch    Cardio program (V5): -  Lifting education (V11): -  Fridge Magnet Discharge handout (date given): -  Equipment at home/gym membership:     Education provided:   - PT role and POC  - HEP    Written Home Exercises Provided: Patient instructed to cont prior HEP.  Exercises were reviewed and Poornima was able to demonstrate them prior to the end of the session.  Poornima demonstrated good  understanding of the education provided.     See EMR under Patient Instructions for exercises provided prior visit.    Assessment     Poornima presents to second healthy back visit reporting neck pain, was able to demo HEP with Mod VC for form. Pt was able to tolerate Medical MedX machine well as follows:  MedX testing performed and patient tolerated test well.  Pt was also able to complete half of the peripheral strengthening exercises without increased discomfort and will complete the complete circuit next visit as tolerated.    Patient is making good progress towards established goals.  Pt will continue to benefit from skilled outpatient physical therapy to address the deficits  stated in the impairment chart, provide pt/family education and to maximize pt's level of independence in the home and community environment.     Anticipated Barriers for therapy: None  Pt's spiritual, cultural and educational needs considered and pt agreeable to plan of care and goals as stated below:     Goals:   GOALS: Pt is in agreement with the following goals.     Short term goals: 6 weeks or 10 visits   - Pt will demonstratte increased cervical MedX ROM as measured by med ex by 5 degrees from initial test which results in improved  ROM of neck for ease with ADLs and driving. Appropriate and Ongoing  - Pt will demonstrate independence with reducing or controlling symptoms with ther ex, movement, or position independently, able to reduce pain 1-2 points on pain scale using strategies taught in therapy.  Appropriate and Ongoing  - Pt will demonstrate increased MedX average isometric strength value by 10% with  when compared to the initial testing resulting in improved ability to perform bending, lifting, and carrying activities safely and confidently. Appropriate and Ongoing     Long term goals: 10 weeks or 20 visits  - Pt will demonstratte increased cervical MedX ROM as measured by med ex by 10 degrees from initial test which results in functional ROM of neck for ease with ADLs and driving.  Appropriate and Ongoing  - Pt will demonstrate increased MedX average isometric strength value  by 20% from initial test to improve ability to lift and carry, and sustain good posture while performing ADL's. Appropriate and Ongoing  - Pt will demonstrate reduced pain and improved functional outcomes as reported on the FOTO by reaching an intake score of >/= 68% functional ability in order to demonstrate subjective improvement in patient's condition. . Appropriate and Ongoing  - Pt will demonstrate independence with reducing or controlling symptoms with ther ex, movement, or position independently, able to reduce pain 2-4  points on pain scale using strategies taught in therapy. Appropriate and Ongoing  - Pt will demonstrate independence with the HEP at discharge. Appropriate and Ongoing    Plan     Continue with established Plan of Care towards established PT goals.     Therapist: Akhil Locke, PT  5/17/2024

## 2024-05-24 ENCOUNTER — CLINICAL SUPPORT (OUTPATIENT)
Dept: REHABILITATION | Facility: HOSPITAL | Age: 29
End: 2024-05-24
Payer: COMMERCIAL

## 2024-05-24 DIAGNOSIS — M54.2 CHRONIC NECK PAIN: Primary | ICD-10-CM

## 2024-05-24 DIAGNOSIS — G89.29 CHRONIC NECK PAIN: Primary | ICD-10-CM

## 2024-05-24 PROCEDURE — 97112 NEUROMUSCULAR REEDUCATION: CPT | Mod: PN

## 2024-05-24 PROCEDURE — 97012 MECHANICAL TRACTION THERAPY: CPT | Mod: PN

## 2024-05-24 NOTE — PROGRESS NOTES
[facility]  Physical Therapy Treatment Note     Name: Poornima Anaya  Clinic Number: [id number]    Therapy Diagnosis:   Encounter Diagnosis   Name Primary?    Chronic neck pain Yes     Physician: Luigi Escalona,*    Visit Date: 5/24/2024    Physician Orders: PT Eval and Treat  Medical Diagnosis from Referral: Chronic neck pain  Evaluation Date: 3/4/2024  Authorization Period Expiration: 12/31/2024     Visit # / Visits authorized: 1 / 1 13 /20    FOTO: 3/25/2024 - Scored: 2 / 3      MedX Testing:MedX testing visit 2     Time In: 1408  Time Out: 1500  Total Billable Time: 52 minutes  INSURANCE and OUTCOMES: Fee for Service with FOTO Outcomes 1/3     Precautions: standard     Pattern of pain determined:    Subjective     Poornima Anaya she is doing ok    Patient reports tolerating previous visit well  Patient reports their pain to be 1/10 on a 0-10 scale with 0 being no pain and 10 being the worst pain imaginable.  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 and function     Objective      Cervical  Isometric Testing on Med X equipment: Testing administered by PT    Test Initial Baseline Midpoint Final   Date 3/8/2024     ROM 54 deg     Max Peak Torque 74      Min Peak Torque 60      Flex/Ext Ratio      % below normative data -63     % gain from initial test Not available visit 1         Outcomes:  Intake Score: 56%  Visit 6 Score: 56%  Visit 10 Score:   Discharge Score:  Goal Score: 68%     Treatment     Poornima received the treatments listed below:                Medical MedX Treatment as follows:  MedX testing performed day 2: Patient  received neuromuscular education to engage spinal musculature correctly for motor control and engagement of musculature for 15 minutes including the MedX exercise component and practice and standard testing. MedX dynamic exercise and baseline isometric test  performed with instructions to guide the patient safely through the testing procedure. Patient instructed to perform isometric test correctly and safely while building to an optimal force with a pain-free effort. Patient also instructed that they should feel support/pressure from MedX restraints but no pain/discomfort, and encouraged to report any pain to therapist. Patient demonstrated appropriate understanding of information and tolerance of test.  Education regarding purpose of test, safety during test given, and reviewed possible more soreness and strategies.       Poornima participated in therapeutic exercises to develop strength, ROM, posture, and core stabilization for --- minutes including:       Activity   Details    UBE x     Upper trap stretch      Doorway stretch                             Peripheral muscle strengthening which included one set of 15-20 repetitions at a slow and controlled 10-13 second per rep pace focused on strengthening supporting musculature in order to improve body mechanics and functional mobility. Patient and therapist focused on proper form during treatment to ensure optimal strengthening of each targeted muscle group.  Machines utilized included:Chest Press and Rowing  To be added next visit:      Poornima participated in neuromuscular re-education activities to improve balance, coordination, proprioception, motor control and/or posture for 40 minutes. The following activities were included:     Activity   Details    MedX cervical x See flowsheet    MedX rotation x 20# 20 (B)    Matrix rows x 25# 20    Matrix chest press x 10# 20    Cable shoulder extension x 20# 3x10    Shoulder flexion in scaption x 2# 20    Shoulder lateral raises to 90 x 2# 20    Supine cervical protraction x 3x10    Prone cervical retraction x 3x10                 Poornima participated in dynamic functional therapeutic activities to improve functional performance and simulate household and community activities  for ---  minutes. The following activities were included:       Activity   Details                                             Poornima received manual therapy techniques for ---  minutes. The following activities were included:    Supervised modalities after being cleared for contradictions: Mechanical Traction:  Poornima received intermittent mechanical traction to the cervical spine at a force of 24 pounds for a total of 10 minutes. Hold time of 30 seconds and rest time for 10 seconds. Patient tolerated treatment well without any adverse effects.       Patient Education and Home Exercises     Home exercises include:  Chin tuck  Upper trap stretch    Cardio program (V5): -  Lifting education (V11): -  Fridge Magnet Discharge handout (date given): -  Equipment at home/gym membership:     Education provided:   - PT role and POC  - HEP    Written Home Exercises Provided: Patient instructed to cont prior HEP.  Exercises were reviewed and Poornima was able to demonstrate them prior to the end of the session.  Poornima demonstrated good  understanding of the education provided.     See EMR under Patient Instructions for exercises provided prior visit.    Assessment     Poornima presents to second healthy back visit reporting neck pain, was able to demo HEP with Mod VC for form. Pt was able to tolerate Medical MedX machine well as follows:  MedX testing performed and patient tolerated test well.  Pt was also able to complete half of the peripheral strengthening exercises without increased discomfort and will complete the complete circuit next visit as tolerated.    Patient is making good progress towards established goals.  Pt will continue to benefit from skilled outpatient physical therapy to address the deficits stated in the impairment chart, provide pt/family education and to maximize pt's level of independence in the home and community environment.     Anticipated Barriers for therapy: None  Pt's spiritual, cultural and  educational needs considered and pt agreeable to plan of care and goals as stated below:     Goals:   GOALS: Pt is in agreement with the following goals.     Short term goals: 6 weeks or 10 visits   - Pt will demonstratte increased cervical MedX ROM as measured by med ex by 5 degrees from initial test which results in improved  ROM of neck for ease with ADLs and driving. Appropriate and Ongoing  - Pt will demonstrate independence with reducing or controlling symptoms with ther ex, movement, or position independently, able to reduce pain 1-2 points on pain scale using strategies taught in therapy.  Appropriate and Ongoing  - Pt will demonstrate increased MedX average isometric strength value by 10% with  when compared to the initial testing resulting in improved ability to perform bending, lifting, and carrying activities safely and confidently. Appropriate and Ongoing     Long term goals: 10 weeks or 20 visits  - Pt will demonstratte increased cervical MedX ROM as measured by med ex by 10 degrees from initial test which results in functional ROM of neck for ease with ADLs and driving.  Appropriate and Ongoing  - Pt will demonstrate increased MedX average isometric strength value  by 20% from initial test to improve ability to lift and carry, and sustain good posture while performing ADL's. Appropriate and Ongoing  - Pt will demonstrate reduced pain and improved functional outcomes as reported on the FOTO by reaching an intake score of >/= 68% functional ability in order to demonstrate subjective improvement in patient's condition. . Appropriate and Ongoing  - Pt will demonstrate independence with reducing or controlling symptoms with ther ex, movement, or position independently, able to reduce pain 2-4 points on pain scale using strategies taught in therapy. Appropriate and Ongoing  - Pt will demonstrate independence with the HEP at discharge. Appropriate and Ongoing    Plan     Continue with established Plan of  Care towards established PT goals.     Therapist: Akhil Locke, PT  5/24/2024                    [address] Exp Problem Focused - Low Complex

## 2024-05-31 ENCOUNTER — CLINICAL SUPPORT (OUTPATIENT)
Dept: REHABILITATION | Facility: HOSPITAL | Age: 29
End: 2024-05-31
Payer: COMMERCIAL

## 2024-05-31 DIAGNOSIS — G89.29 CHRONIC NECK PAIN: Primary | ICD-10-CM

## 2024-05-31 DIAGNOSIS — M54.2 CHRONIC NECK PAIN: Primary | ICD-10-CM

## 2024-05-31 PROCEDURE — 97112 NEUROMUSCULAR REEDUCATION: CPT | Mod: PN

## 2024-05-31 NOTE — PROGRESS NOTES
[facility]  Physical Therapy Treatment Note     Name: Poornima Anaya  Clinic Number: [id number]    Therapy Diagnosis:   Encounter Diagnosis   Name Primary?    Chronic neck pain Yes       Physician: Luigi Escalona,*    Visit Date: 5/31/2024    Physician Orders: PT Eval and Treat  Medical Diagnosis from Referral: Chronic neck pain  Evaluation Date: 3/4/2024  Authorization Period Expiration: 12/31/2024     Visit # / Visits authorized: 1 / 1 14 /20    FOTO: 3/25/2024 - Scored: 2 / 3      MedX Testing:MedX testing visit 2     Time In: 1401  Time Out: 1455  Total Billable Time: 54 minutes  INSURANCE and OUTCOMES: Fee for Service with FOTO Outcomes 1/3     Precautions: standard     Pattern of pain determined:    Subjective     Poornima Anaya she is doing ok    Patient reports tolerating previous visit well  Patient reports their pain to be 1/10 on a 0-10 scale with 0 being no pain and 10 being the worst pain imaginable.  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 and function     Objective      Cervical  Isometric Testing on Med X equipment: Testing administered by PT    Test Initial Baseline Midpoint Final   Date 3/8/2024     ROM 54 deg     Max Peak Torque 74      Min Peak Torque 60      Flex/Ext Ratio      % below normative data -63     % gain from initial test Not available visit 1         Outcomes:  Intake Score: 56%  Visit 6 Score: 56%  Visit 10 Score:   Discharge Score:  Goal Score: 68%     Treatment     Poornima received the treatments listed below:                Medical MedX Treatment as follows:  MedX testing performed day 2: Patient  received neuromuscular education to engage spinal musculature correctly for motor control and engagement of musculature for 15 minutes including the MedX exercise component and practice and standard testing. MedX dynamic exercise and baseline isometric test  performed with instructions to guide the patient safely through the testing procedure. Patient instructed to perform isometric test correctly and safely while building to an optimal force with a pain-free effort. Patient also instructed that they should feel support/pressure from MedX restraints but no pain/discomfort, and encouraged to report any pain to therapist. Patient demonstrated appropriate understanding of information and tolerance of test.  Education regarding purpose of test, safety during test given, and reviewed possible more soreness and strategies.       Poornima participated in therapeutic exercises to develop strength, ROM, posture, and core stabilization for --- minutes including:       Activity   Details    UBE x     Upper trap stretch      Doorway stretch                             Peripheral muscle strengthening which included one set of 15-20 repetitions at a slow and controlled 10-13 second per rep pace focused on strengthening supporting musculature in order to improve body mechanics and functional mobility. Patient and therapist focused on proper form during treatment to ensure optimal strengthening of each targeted muscle group.  Machines utilized included:Chest Press and Rowing  To be added next visit:      Poornima participated in neuromuscular re-education activities to improve balance, coordination, proprioception, motor control and/or posture for 45 minutes. The following activities were included:     Activity   Details    MedX cervical x See flowsheet    MedX rotation x 20# 20 (B)    Matrix rows x 25# 20    Matrix chest press x 10# 20    Cable shoulder extension x 20# 3x10    Shoulder flexion in scaption x 2# 20    Shoulder lateral raises to 90 x 2# 20    Supine cervical protraction x 3x10    Prone cervical retraction x 3x10                 Poornima participated in dynamic functional therapeutic activities to improve functional performance and simulate household and community activities  for ---  minutes. The following activities were included:       Activity   Details                                             Poornima received manual therapy techniques for ---  minutes. The following activities were included:    Supervised modalities after being cleared for contradictions: Mechanical Traction:  Poornima received intermittent mechanical traction to the cervical spine at a force of 24 pounds for a total of 10 minutes. Hold time of 30 seconds and rest time for 10 seconds. Patient tolerated treatment well without any adverse effects.       Patient Education and Home Exercises     Home exercises include:  Chin tuck  Upper trap stretch    Cardio program (V5): -  Lifting education (V11): -  Fridge Magnet Discharge handout (date given): -  Equipment at home/gym membership:     Education provided:   - PT role and POC  - HEP    Written Home Exercises Provided: Patient instructed to cont prior HEP.  Exercises were reviewed and Poorinma was able to demonstrate them prior to the end of the session.  Poornima demonstrated good  understanding of the education provided.     See EMR under Patient Instructions for exercises provided prior visit.    Assessment     Poornima presents to second healthy back visit reporting neck pain, was able to demo HEP with Mod VC for form. Pt was able to tolerate Medical MedX machine well as follows:  MedX testing performed and patient tolerated test well.  Pt was also able to complete half of the peripheral strengthening exercises without increased discomfort and will complete the complete circuit next visit as tolerated.    Patient is making good progress towards established goals.  Pt will continue to benefit from skilled outpatient physical therapy to address the deficits stated in the impairment chart, provide pt/family education and to maximize pt's level of independence in the home and community environment.     Anticipated Barriers for therapy: None  Pt's spiritual, cultural and  educational needs considered and pt agreeable to plan of care and goals as stated below:     Goals:   GOALS: Pt is in agreement with the following goals.     Short term goals: 6 weeks or 10 visits   - Pt will demonstratte increased cervical MedX ROM as measured by med ex by 5 degrees from initial test which results in improved  ROM of neck for ease with ADLs and driving. Met  - Pt will demonstrate independence with reducing or controlling symptoms with ther ex, movement, or position independently, able to reduce pain 1-2 points on pain scale using strategies taught in therapy.  Met  - Pt will demonstrate increased MedX average isometric strength value by 10% with  when compared to the initial testing resulting in improved ability to perform bending, lifting, and carrying activities safely and confidently. Met     Long term goals: 10 weeks or 20 visits  - Pt will demonstratte increased cervical MedX ROM as measured by med ex by 10 degrees from initial test which results in functional ROM of neck for ease with ADLs and driving.  Met  - Pt will demonstrate increased MedX average isometric strength value  by 20% from initial test to improve ability to lift and carry, and sustain good posture while performing ADL's. Met  - Pt will demonstrate reduced pain and improved functional outcomes as reported on the FOTO by reaching an intake score of >/= 68% functional ability in order to demonstrate subjective improvement in patient's condition. . Met  - Pt will demonstrate independence with reducing or controlling symptoms with ther ex, movement, or position independently, able to reduce pain 2-4 points on pain scale using strategies taught in therapy. Appropriate and Ongoing  - Pt will demonstrate independence with the HEP at discharge. Met    Plan     Continue with established Plan of Care towards established PT goals.     Therapist: Akhil Locke, PT  6/1/2024

## 2024-06-02 DIAGNOSIS — G43.009 MIGRAINE WITHOUT AURA AND WITHOUT STATUS MIGRAINOSUS, NOT INTRACTABLE: ICD-10-CM

## 2024-06-02 RX ORDER — PROPRANOLOL HYDROCHLORIDE 20 MG/1
20 TABLET ORAL 2 TIMES DAILY
Qty: 180 TABLET | Refills: 0 | OUTPATIENT
Start: 2024-06-02

## 2024-06-02 NOTE — TELEPHONE ENCOUNTER
No care due was identified.  [facility] Embedded Care Due Messages. Reference number: [id number].   6/02/2024 8:31:07 AM CDT

## 2024-06-03 NOTE — TELEPHONE ENCOUNTER
Quick DC. Inappropriate Request    Refill Authorization Note   Poornima Anaya  is requesting a refill authorization.  Brief Assessment and Rationale for Refill:  Quick Discontinue  Medication Therapy Plan:  discontinued on 5/8/2024 by Domitila Ceballos DO for the following reason: Dose adjustment    Medication Reconciliation Completed:  No      Comments:     Note composed:10:31 PM 06/02/2024

## 2024-07-23 DIAGNOSIS — G43.009 MIGRAINE WITHOUT AURA AND WITHOUT STATUS MIGRAINOSUS, NOT INTRACTABLE: ICD-10-CM

## 2024-07-23 RX ORDER — PROPRANOLOL HYDROCHLORIDE 20 MG/1
20 TABLET ORAL 2 TIMES DAILY
Qty: 180 TABLET | Refills: 0 | OUTPATIENT
Start: 2024-07-23

## 2024-07-23 NOTE — TELEPHONE ENCOUNTER
No care due was identified.  [facility] Embedded Care Due Messages. Reference number: [id number].   7/23/2024 3:51:49 PM CDT

## 2024-07-24 NOTE — TELEPHONE ENCOUNTER
Dorota DC. Inappropriate Request    Refill Authorization Note   Poornima Anaya  is requesting a refill authorization.  Brief Assessment and Rationale for Refill:  Quick Discontinue  Medication Therapy Plan:  dc'd 5/8/2024 by Domitila Ceballos DO for the following reason: Dose adjustment    Medication Reconciliation Completed:  No      Comments:     Note composed:10:40 PM 07/23/2024

## 2024-11-21 NOTE — LETTER
January 10, 2023      [facility]  [address] [address]  Phone: [phone]  Fax: [phone]       Patient: Poornima Anaya   YOB: 1995  Date of Visit: 01/10/2023    To Whom It May Concern:    Barb Anaya  was at [facility] on 01/10/2023. The patient may return to work with no restrictions 1/13/23 . If you have any questions or concerns, or if I can be of further assistance, please do not hesitate to contact me.    Sincerely,    Domitila Ceballos, DO     
not applicable

## 2025-01-16 ENCOUNTER — TELEPHONE (OUTPATIENT)
Dept: NEUROLOGY | Facility: CLINIC | Age: 30
End: 2025-01-16
Payer: COMMERCIAL

## 2025-01-16 NOTE — PROGRESS NOTES
"Subjective:       Patient ID: Poornima Anaya is a 30 y.o. female.      Chief Complaint: "Migraines"        HPI    HPI 30 y.o.  Years old female   with PMHx of  has a past medical history of Allergy, Elevated cortisol level, Migraines, Recurrent upper respiratory infection (URI), and Urticaria.  and other medical conditions came for the evaluation and recommendation of "Migraines".    -Patient referred by No referring provider defined for this encounter.     Patient Summary:  The patient is a 34-week pregnant woman (due February 25), who has a long history of migraine headaches starting at the age of 16. She was previously established with Neurology care in [place] and would like to re-establish care with [facility] [place] due to her recent relocation. The patient reports intermittent migraine attacks, with frequency of 1-2 times per week, and approximately 2 per month migraine-like. These headaches are characterized by throbbing pain, lasting between 2 hours to 2 days, and are often severe (rated 2-8/10). The pain is primarily located in the bilateral frontal and parietal areas, frequently affecting the left side. The migraines are associated with nausea, vomiting, light/sound sensitivity, and neck tightness, but no neurological deficits, visual auras, or significant other neurological symptoms are reported. Triggers include stress and lack of sleep, with no identifiable food triggers.    Pertinent Negative Symptoms: No associated neurological deficits, no scalp sensitivity,  neck pain with radiation, ear ringing, dizziness, balance issues, acute thunderclap onset, double or blurry vision, focal or bilateral limb weakness, or extremity numbness and tingling.    Alleviated: Dark and quiet room, cold towels to the forehead    Current Medications: OTC Tylenol / Imitrex / Inderal-LA / Zofran    Migraine History and Management:  The patient would like to resume prior treatment post-partum, particularly " reintroducing AIMOVIG and other effective treatments.    Current Pregnancy:  Patient is 34 weeks gestation with an expected due date of February 25.  No specific pregnancy-related concerns regarding headaches or migraine management, though medications were adjusted in consultation with the OB-GYN.  The patient denies any significant issues with sleep, and there is no indication of sleep apnea or other related disorders.    Family History:  Migraines are common in the patients maternal side (mother, sister, and maternal grandmother).    Positional/Behavioral Factors: Headaches are not positional or postural, not worsened by movement and bending the head downward. Denies worsening with Valsalva maneuver.     Exclusions: No TMJ issues, head trauma, seizures, smoking history, caffeine overuse, vertigo, blackouts, fever, chills, or significant memory loss. No history of strokes or falls.    Ophthalmological History: Last eye clinic appointment about 4 months ago, with normal pressures, no papilledema, and no abnormalities reported per patient.       Abortive therapies (tried and failed):     -Imitrex 100 mg PO PRN - effective - current - patient reports she limits usage during pregnancy     -Nurtec - effective     -Fioricet - not effective      Preventative therapies (tried and failed):     -Patient previously prescribed Inderal-LA 60 mg PO Daily, but Migraine freqnency reduced during pregnancy and OBGYN prescribed Inderal-LA 20-40 mg PO Daily - Patient reports she takes 20 mg PRN for Migraines. - Current     -Effexor - not effective     -Topamax - not effective     -AIMOVIG - effective, but patient stopped during pregnancy and started on Inderal-LA    -Anovy - effective     Pregnancy and birth control: None - patient is currently pregnant         Review of Systems   Constitutional:  Negative for activity change, appetite change, chills, diaphoresis, fatigue, fever and unexpected weight change.   HENT:  Negative for  congestion, dental problem, drooling, ear discharge, ear pain, facial swelling, hearing loss, mouth sores, nosebleeds, postnasal drip, rhinorrhea, sinus pressure, sinus pain, sneezing, sore throat, tinnitus, trouble swallowing and voice change.    Eyes:  Positive for photophobia. Negative for pain, discharge, redness, itching and visual disturbance.   Respiratory:  Negative for cough, chest tightness, shortness of breath and wheezing.    Cardiovascular:  Negative for chest pain, palpitations and leg swelling.   Gastrointestinal:  Positive for nausea. Negative for abdominal distention, abdominal pain, blood in stool, constipation, diarrhea and vomiting.   Endocrine: Negative for cold intolerance, heat intolerance, polydipsia, polyphagia and polyuria.   Genitourinary:  Negative for decreased urine volume, difficulty urinating, dysuria, flank pain, frequency, hematuria, pelvic pain, urgency and vaginal discharge.   Musculoskeletal:  Negative for arthralgias, back pain, gait problem, joint swelling, myalgias, neck pain and neck stiffness.        Neck Tightness    Skin:  Negative for color change and rash.   Allergic/Immunologic: Negative for immunocompromised state.   Neurological:  Positive for headaches. Negative for dizziness, tremors, seizures, syncope, facial asymmetry, speech difficulty, weakness, light-headedness and numbness.   Hematological:  Negative for adenopathy. Does not bruise/bleed easily.   Psychiatric/Behavioral:  Negative for agitation, behavioral problems, confusion, decreased concentration, dysphoric mood, hallucinations, self-injury, sleep disturbance and suicidal ideas. The patient is not nervous/anxious and is not hyperactive.    All other systems reviewed and are negative.              Current Outpatient Medications:     buPROPion (WELLBUTRIN XL) 150 MG TB24 tablet, Take 1 tablet (150 mg total) by mouth once daily., Disp: 90 tablet, Rfl: 3    cetirizine (ZYRTEC) 10 mg Cap, , Disp: , Rfl:      cyanocobalamin (VITAMIN B-12) 250 MCG tablet, Take 250 mcg by mouth once daily. ?dose, Disp: , Rfl:     erenumab-aooe (AIMOVIG AUTOINJECTOR) 70 mg/mL autoinjector, Inject 1 mL (70 mg total) into the skin every 28 days., Disp: 1 each, Rfl: 11    fluticasone propionate (FLONASE) 50 mcg/actuation nasal spray, 2 sprays (100 mcg total) by Each Nostril route 2 (two) times daily., Disp: 31.6 mL, Rfl: 5    propranoloL (INDERAL LA) 60 MG 24 hr capsule, Take 1 capsule (60 mg total) by mouth once daily., Disp: 90 capsule, Rfl: 1    sumatriptan (IMITREX) 100 MG tablet, Take 1 tablet (100 mg total) by mouth 2 (two) times daily as needed for Migraine., Disp: 12 tablet, Rfl: 11    Past Medical History:   Diagnosis Date    Allergy     Elevated cortisol level 12/29/2022    - she reports a history of a high cortisol level recommended repeat 8 a.m. cortisol which was normal    Migraines     Recurrent upper respiratory infection (URI)     Urticaria        Past Surgical History:   Procedure Laterality Date    ADENOIDECTOMY  12/2016    TONSILLECTOMY Bilateral     TYMPANOSTOMY TUBE PLACEMENT         Social History     Socioeconomic History    Marital status:     Number of children: 0   Tobacco Use    Smoking status: Never     Passive exposure: Never    Smokeless tobacco: Never   Substance and Sexual Activity    Alcohol use: Yes     Alcohol/week: 2.0 standard drinks of alcohol     Types: 2 Glasses of wine per week     Comment: weekend occasional    Drug use: Never    Sexual activity: Yes     Partners: Male     Birth control/protection: I.U.D.   Social History Narrative    . No children. 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 Grew up in the [facility]. Parents now live in [place], [place]. She moved back to [facility] 6/2022     Social Drivers of Health     Financial Resource Strain: Low Risk  (3/12/2024)    Overall Financial Resource Strain (CARDIA)     Difficulty of Paying Living Expenses: Not hard at all   Food Insecurity: No Food  Insecurity (3/12/2024)    Hunger Vital Sign     Worried About Running Out of Food in the Last Year: Never true     Ran Out of Food in the Last Year: Never true   Transportation Needs: No Transportation Needs (3/12/2024)    PRAPARE - Transportation     Lack of Transportation (Medical): No     Lack of Transportation (Non-Medical): No   Physical Activity: Unknown (3/12/2024)    Exercise Vital Sign     Days of Exercise per Week: 2 days   Stress: No Stress Concern Present (3/12/2024)    [employer] - Occupational Stress Questionnaire     Feeling of Stress : Only a little   Housing Stability: Unknown (3/12/2024)    Housing Stability Vital Sign     Unable to Pay for Housing in the Last Year: No     Unstable Housing in the Last Year: No         Past/Current Medical/Surgical History, Past/Current Social History, Past/Current Family History and Past/Current Medications were reviewed in detail.    Objective:           VITAL SIGNS WERE REVIEWED      GENERAL APPEARANCE:     The patient looks comfortable.    BMI    No signs of respiratory distress.    Normal breathing pattern.    No dysmorphic features    Normal eye contact.       GENERAL MEDICAL EXAM:    HEENT:  Head is atraumatic normocephalic.     FUNDUSCOPIC (OPHTHALMOSCOPIC) EXAMINATION showed no disc edema (papilledema).      NECK: No JVD. No visible lesions or goiters.     CHEST-CARDIOPULMONARY: No cyanosis. No tachypnea. Normal respiratory effort.    RCKDQIT-[id number]: No jaundice. No stomas or lesions. No visible hernias. No catheters.     SKIN, HAIR, NAILS: No pathognomonic skin rash.No neurofibromatosis. No visible lesions.No stigmata of autoimmune disease. No clubbing.    LIMBS: No varicose veins. No visible swelling.    MUSCULOSKELETAL: No visible deformities.No visible lesions.             Neurological Exam  Mental Status  Awake, alert and oriented to person, place and time. Oriented to person, place, time and  situation. Recent and remote memory are intact. At 5 minutes recalls 3 of 3 objects. Speech is normal. Language is fluent with no aphasia. Attention and concentration are normal. Fund of knowledge is appropriate for level of education. Apraxia absent.    Cranial Nerves  CN I: Sense of smell is normal.  CN II: Visual acuity is normal. Visual fields full to confrontation. Right funduscopic exam: disc intact. Left funduscopic exam: disc intact.  CN III, IV, VI: Extraocular movements intact bilaterally. Normal lids and orbits bilaterally. Pupils equal round and reactive to light bilaterally.  CN V: Facial sensation is normal.  CN VII: Full and symmetric facial movement.  CN VIII: Hearing is normal.  CN IX, X: Palate elevates symmetrically. Normal gag reflex.  CN XI: Shoulder shrug strength is normal.  CN XII: Tongue midline without atrophy or fasciculations.    Motor  Normal muscle bulk throughout. No fasciculations present. Normal muscle tone. No abnormal involuntary movements. Strength is 5/5 throughout all four extremities.    Sensory  Sensation is intact to light touch, pinprick, vibration and proprioception in all four extremities.    Reflexes  Deep tendon reflexes are 2+ and symmetric in all four extremities.    Coordination  Right: Finger-to-nose normal. Rapid alternating movement normal. Heel-to-shin normal.Left: Finger-to-nose normal. Rapid alternating movement normal. Heel-to-shin normal.    Gait  Casual gait is normal including stance, stride, and arm swing.Normal toe walking. Normal heel walking. Normal tandem gait. Romberg is absent. Normal pull test. Able to rise from chair without using arms.        Lab Results   Component Value Date    WBC 6.05 11/09/2022    HGB 14.1 11/09/2022    HCT 42.7 11/09/2022    MCV 93 11/09/2022     11/09/2022       Sodium   Date Value Ref Range Status   11/09/2022 140 136 - 145 mmol/L Final     Potassium   Date Value Ref Range Status   11/09/2022 4.5 3.5 - 5.1 mmol/L  "Final     Chloride   Date Value Ref Range Status   11/09/2022 107 95 - 110 mmol/L Final     CO2   Date Value Ref Range Status   11/09/2022 25 23 - 29 mmol/L Final     Glucose   Date Value Ref Range Status   11/09/2022 90 70 - 110 mg/dL Final     BUN   Date Value Ref Range Status   11/09/2022 13 6 - 20 mg/dL Final     Creatinine   Date Value Ref Range Status   11/09/2022 0.9 0.5 - 1.4 mg/dL Final     Calcium   Date Value Ref Range Status   11/09/2022 9.6 8.7 - 10.5 mg/dL Final     Total Protein   Date Value Ref Range Status   11/09/2022 7.1 6.0 - 8.4 g/dL Final     Albumin   Date Value Ref Range Status   11/09/2022 4.4 3.5 - 5.2 g/dL Final     Total Bilirubin   Date Value Ref Range Status   11/09/2022 0.7 0.1 - 1.0 mg/dL Final     Comment:     For infants and newborns, interpretation of results should be based  on gestational age, weight and in agreement with clinical  observations.    Premature Infant recommended reference ranges:  Up to 24 hours.............<8.0 mg/dL  Up to 48 hours............<12.0 mg/dL  3-5 days..................<15.0 mg/dL  6-29 days.................<15.0 mg/dL       Alkaline Phosphatase   Date Value Ref Range Status   11/09/2022 44 (L) 55 - 135 U/L Final     AST   Date Value Ref Range Status   11/09/2022 18 10 - 40 U/L Final     ALT   Date Value Ref Range Status   11/09/2022 12 10 - 44 U/L Final     Anion Gap   Date Value Ref Range Status   11/09/2022 8 8 - 16 mmol/L Final       Lab Results   Component Value Date    [id number] 1043 (H) 08/18/2023       Lab Results   Component Value Date    TSH 1.101 11/09/2022       No results found in the last 24 hours.    No results found in the last 24 hours.    Reviewed the neuroimaging independently       Assessment:   30 y.o. Years old female  with PMH as above came for an evaluation of "Migraines".    1. Chronic migraine without aura without status migrainosus, not intractable  sumatriptan (IMITREX) 100 MG tablet    Homocysteine, Serum    Folate    " Vitamin B12    Vitamin B1    Comprehensive Metabolic Panel    Sedimentation rate    C-Reactive Protein    MICHAEL Screen w/Reflex      2. Chronic tension-type headache, not intractable        3. Nausea        4. Neck tightness             Plan:   Patient Neurological Assessment is non-focal     HEADACHE PLAN    -Patient instructed to keep a headache diary for review at next follow-up visit.     -Defer Brain MRI WO Contrast due to current pregnancy -  will consider post pregnancy to rule out structural abnormalities contributing to Headaches. We will schedule follow up appointment after MRI brain has been completed.     -Patient refused PT Referral for Headache Prevention Therapy / Neck therapy.    -Continue routine ophthalmology evaluation.     -Consider resuming AIMOVIG, adjusting preventative treatments as needed, and review abortive therapy options post-partum.    -Regular monitoring for potential changes in headache pattern or associated symptoms postpartum.    -All medication indications and potential side effects were discussed in detail with the patient. Informational pamphlets were provided for further reference. The patient verbally confirmed full understanding of the medications and their instructions.    -The patient was advised that only Tylenol (OTC) is considered safe for use during pregnancy. The potential side effects of Zofran, Inderal, and Imitrex during pregnancy were discussed in detail. The patient verbalized full understanding of the risks and benefits of these medications and expressed a desire to proceed with her current treatment plan. She requested refills for Imitrex 100 mg PO PRN, and reports that her OB-GYN has approved this therapy during her pregnancy.  Regarding AIMOVIG, the patient expressed interest in restarting this treatment postpartum, although she plans to breastfeed. It was explained that AIMOVIG is not recommended during pregnancy or breastfeeding due to the lack of data on fetal  effects and limited data on evidence-based guidelines. The patient intends to discuss AIMOVIG use with her pediatrician postpartum to assess whether the pediatrician approves its administration while breastfeeding.  The patient is advised to continue routine follow-up with her OB-GYN throughout the remainder of her pregnancy. She verbalized full understanding of the information provided.    -Continue sumatriptan (IMITREX) 100 MG tablet; Take 1 tablet (100 mg total) by mouth daily as needed for Migraine Abortive therapy. Patient requests to continue treatment knowing potential risks during pregnancy.    -Continue Propanolol 20 mg PO Daily - patient takes PRN for Migraine Prevention Therapy. She requests to continue medication as prescribed knowing potential risks during pregnancy.    -Continue Zofran 4 mg Q8H PRN Nausea as prescribed. She requests to continue medication as prescribed knowing potential risks during pregnancy.      1. Chronic migraine without aura without status migrainosus, not intractable  - sumatriptan (IMITREX) 100 MG tablet; Take 1 tablet (100 mg total) by mouth daily as needed for Migraine. (Take 1 tablet by mouth at onset of migraine, can repeat in 2 hours if needed. No more than 2 tabs per day or 3 days/wk  Dispense: 9 tablet; Refill: 0  - Homocysteine, Serum; Future  - Folate; Future  - Vitamin B12; Future  - Vitamin B1; Future  - Comprehensive Metabolic Panel; Future  - Sedimentation rate; Future  - C-Reactive Protein; Future  - MICHAEL Screen w/Reflex; Future    2. Chronic tension-type headache, not intractable    3. Nausea    4. Neck tightness             LABORATORY EVALUATION    Labs: ([id number]) BV / Glucose / Urine Drug Screen / TSH / Varicella-Zoster Virus Ab, IgG / CBC / Rubella IgG Quant / RPR / Hep B / HIV /   -personally reviewed -non-significant abnormalities     RADIOLOGY EVALUATION     Personally Reviewed X-Ray Cervical Spine  - done 01/2024 - no acute abnormalities / FINDINGS:  No  acute fractures.  Unremarkable predental space and no widening of prevertebral soft tissues.  Reversal of normal cervical lordosis on the neutral lateral image.  No definite findings of anterior or retrolisthesis on the neutral lateral view.  On flexion and extension views, no findings of instability.  Preserved cervical disc spaces.  Preserved uncovertebral and facet articulations.  Oblique views demonstrate preserved right and left foramen.  Intact odontoid tip and unremarkable C1-C2 articulation.  Based on the AP image, very minimal lower cervical and upper thoracic levocurvature and partially visualized the upper thoracic dextrocurvature..      NEUROPHYSIOLOGY EVALUATION       PATHOLOGY EVALUATION        NEUROCOGNITIVE AND NEUROPSYCHOLOGY EVALUATION                  COMMON MIGRAINE WITHOUT AURA, EPISODIC, MENSTRUAL WITH EPISODIC TENSION HEADACHE       HEADACHE DIARY         DISCUSSED THE THREE-FOLD MANAGEMENT OF MIGRAINE:      LIFESTYLE CHANGES:       Good sleep hygiene  Avoid triggers  Minimize physical and emotional stress  Good hydration   Small frequent meals and avoid skipping meals   Moderate aerobic exercises.         ABORTIVE MEDICATIONS (ACUTE-RESCUE MEDICATIONS):       PRN medications are not safe.    First line is Acetaminophen (1000 mg orally) because it has the best maternal-fetal safety profile.     Acetaminophen 650 to 1000 mg and Metoclopramide / Reglan 10 mg.     -Discussed Metoclopramide / Reglan side effects including CNS Depression, Drowsiness, fatigue, lassitude, dizziness, confusion, dystonic reactions (such as involuntary limb movements, Akathisia (motor restlessness), manifesting as feelings of anxiety, agitation, insomnia, inability to sit still, pacing, and foot tapping, Parkinsonism symptoms, Tardive dyskinesia), Galactorrhea not associated with childbirth, amenorrhea, and gynecomastia. Patient verbalized full understanding.       PREVENTATIVE (MORE ACCURATELY MIGRAINE REDUCTION)  MEDICATIONS:       Traditional preventative options are not safe including Botox.        PREGNANCY AND MIGRAINE           We had a lengthy discussion about managing migraine during pregnancy.      PRN medications are not safe. The safest option is Reglan PRN and not to be taken more than 2-3 times/week. Other options:  Fioricet PRN  and Steroids PRN as well. Fioricet should be limited to only four to five days per month.     Traditional preventative options are not safe including Botox. Neuromodulation like  Cefaly and Relivion are safe during pregnancy.        MEDICAL/SURGICAL COMORBIDITIES     All relevant medical comorbidities noted and managed by primary care physician and medical care team.          HEALTHY LIFESTYLE AND PREVENTATIVE CARE    The patient to adhere to the age-appropriate health maintenance guidelines including screening tests and vaccinations. The patient to adhere to  healthy lifestyle, optimal weight, exercise, healthy diet, good sleep hygiene and avoiding drugs including smoking, alcohol and recreational drugs.    I spent a total of 65 minutes on the day of the visit.This includes face to face time and non-face to face time preparing to see the patient (eg, review of tests), obtaining and/or reviewing separately obtained history, documenting clinical information in the electronic or other health record, independently interpreting results and communicating results to the patient/family/caregiver, or care coordinator.     Please do not hesitate to contact me with any updates, questions or concerns.    No follow-ups on file.    Jorge Diaz, MSN, FNP-C    General Neurology

## 2025-01-17 ENCOUNTER — OFFICE VISIT (OUTPATIENT)
Dept: NEUROLOGY | Facility: CLINIC | Age: 30
End: 2025-01-17
Payer: COMMERCIAL

## 2025-01-17 ENCOUNTER — LAB VISIT (OUTPATIENT)
Dept: LAB | Facility: HOSPITAL | Age: 30
End: 2025-01-17
Attending: INTERNAL MEDICINE
Payer: COMMERCIAL

## 2025-01-17 VITALS
RESPIRATION RATE: 16 BRPM | WEIGHT: 189.38 LBS | BODY MASS INDEX: 35.75 KG/M2 | HEART RATE: 87 BPM | OXYGEN SATURATION: 99 % | DIASTOLIC BLOOD PRESSURE: 77 MMHG | HEIGHT: 61 IN | SYSTOLIC BLOOD PRESSURE: 121 MMHG

## 2025-01-17 DIAGNOSIS — G43.709 CHRONIC MIGRAINE WITHOUT AURA WITHOUT STATUS MIGRAINOSUS, NOT INTRACTABLE: ICD-10-CM

## 2025-01-17 DIAGNOSIS — G44.229 CHRONIC TENSION-TYPE HEADACHE, NOT INTRACTABLE: ICD-10-CM

## 2025-01-17 DIAGNOSIS — G43.709 CHRONIC MIGRAINE WITHOUT AURA WITHOUT STATUS MIGRAINOSUS, NOT INTRACTABLE: Primary | ICD-10-CM

## 2025-01-17 DIAGNOSIS — R11.0 NAUSEA: ICD-10-CM

## 2025-01-17 DIAGNOSIS — R29.898 NECK TIGHTNESS: ICD-10-CM

## 2025-01-17 LAB — ERYTHROCYTE [SEDIMENTATION RATE] IN BLOOD BY PHOTOMETRIC METHOD: 25 MM/HR (ref 0–36)

## 2025-01-17 PROCEDURE — 80053 COMPREHEN METABOLIC PANEL: CPT

## 2025-01-17 PROCEDURE — 82607 VITAMIN B-12: CPT

## 2025-01-17 PROCEDURE — 84425 ASSAY OF VITAMIN B-1: CPT

## 2025-01-17 PROCEDURE — 36415 COLL VENOUS BLD VENIPUNCTURE: CPT

## 2025-01-17 PROCEDURE — 1160F RVW MEDS BY RX/DR IN RCRD: CPT | Mod: CPTII,S$GLB,,

## 2025-01-17 PROCEDURE — 1159F MED LIST DOCD IN RCRD: CPT | Mod: CPTII,S$GLB,,

## 2025-01-17 PROCEDURE — 86039 ANTINUCLEAR ANTIBODIES (ANA): CPT

## 2025-01-17 PROCEDURE — 3008F BODY MASS INDEX DOCD: CPT | Mod: CPTII,S$GLB,,

## 2025-01-17 PROCEDURE — 86038 ANTINUCLEAR ANTIBODIES: CPT

## 2025-01-17 PROCEDURE — 3078F DIAST BP <80 MM HG: CPT | Mod: CPTII,S$GLB,,

## 2025-01-17 PROCEDURE — 85652 RBC SED RATE AUTOMATED: CPT

## 2025-01-17 PROCEDURE — 82746 ASSAY OF FOLIC ACID SERUM: CPT

## 2025-01-17 PROCEDURE — 86225 DNA ANTIBODY NATIVE: CPT

## 2025-01-17 PROCEDURE — 3074F SYST BP LT 130 MM HG: CPT | Mod: CPTII,S$GLB,,

## 2025-01-17 PROCEDURE — 86140 C-REACTIVE PROTEIN: CPT

## 2025-01-17 PROCEDURE — 99999 PR PBB SHADOW E&M-EST. PATIENT-LVL IV: CPT | Mod: PBBFAC,,,

## 2025-01-17 PROCEDURE — 83090 ASSAY OF HOMOCYSTEINE: CPT

## 2025-01-17 PROCEDURE — 99215 OFFICE O/P EST HI 40 MIN: CPT | Mod: S$GLB,,,

## 2025-01-17 RX ORDER — SUMATRIPTAN SUCCINATE 100 MG/1
100 TABLET ORAL DAILY PRN
Qty: 9 TABLET | Refills: 0 | Status: SHIPPED | OUTPATIENT
Start: 2025-01-17 | End: 2025-02-16

## 2025-01-17 RX ORDER — BUTALBITAL, ACETAMINOPHEN AND CAFFEINE 300; 40; 50 MG/1; MG/1; MG/1
1 CAPSULE ORAL EVERY 4 HOURS PRN
COMMUNITY
Start: 2024-08-16 | End: 2025-01-17

## 2025-01-17 RX ORDER — PROPRANOLOL HYDROCHLORIDE 20 MG/1
20-40 TABLET ORAL
COMMUNITY
Start: 2024-08-11

## 2025-01-17 RX ORDER — ONDANSETRON 4 MG/1
4 TABLET, FILM COATED ORAL EVERY 8 HOURS PRN
COMMUNITY
Start: 2024-08-05

## 2025-01-18 LAB
ALBUMIN SERPL BCP-MCNC: 2.9 G/DL (ref 3.5–5.2)
ALP SERPL-CCNC: 120 U/L (ref 40–150)
ALT SERPL W/O P-5'-P-CCNC: 16 U/L (ref 10–44)
ANION GAP SERPL CALC-SCNC: 12 MMOL/L (ref 8–16)
AST SERPL-CCNC: 20 U/L (ref 10–40)
BILIRUB SERPL-MCNC: 0.3 MG/DL (ref 0.1–1)
BUN SERPL-MCNC: 6 MG/DL (ref 6–20)
CALCIUM SERPL-MCNC: 9 MG/DL (ref 8.7–10.5)
CHLORIDE SERPL-SCNC: 106 MMOL/L (ref 95–110)
CO2 SERPL-SCNC: 18 MMOL/L (ref 23–29)
CREAT SERPL-MCNC: 0.7 MG/DL (ref 0.5–1.4)
CRP SERPL-MCNC: 4 MG/L (ref 0–8.2)
EST. GFR  (NO RACE VARIABLE): >60 ML/MIN/1.73 M^2
FOLATE SERPL-MCNC: 16.8 NG/ML (ref 4–24)
GLUCOSE SERPL-MCNC: 75 MG/DL (ref 70–110)
HCYS SERPL-SCNC: 3.3 UMOL/L (ref 4–15.5)
POTASSIUM SERPL-SCNC: 4.1 MMOL/L (ref 3.5–5.1)
PROT SERPL-MCNC: 6.6 G/DL (ref 6–8.4)
SODIUM SERPL-SCNC: 136 MMOL/L (ref 136–145)
VIT B12 SERPL-MCNC: 552 PG/ML (ref 210–950)

## 2025-01-22 DIAGNOSIS — R76.8 POSITIVE ANA (ANTINUCLEAR ANTIBODY): Primary | ICD-10-CM

## 2025-01-24 LAB
ANTI SM ANTIBODY: 0.07 RATIO (ref 0–0.99)
ANTI SM/RNP ANTIBODY: 0.09 RATIO (ref 0–0.99)
ANTI-SM INTERPRETATION: NEGATIVE
ANTI-SM/RNP INTERPRETATION: NEGATIVE
ANTI-SSA ANTIBODY: 0.07 RATIO (ref 0–0.99)
ANTI-SSA INTERPRETATION: NEGATIVE
ANTI-SSB ANTIBODY: 0.07 RATIO (ref 0–0.99)
ANTI-SSB INTERPRETATION: NEGATIVE
DSDNA AB SER-ACNC: NORMAL [IU]/ML

## 2025-01-29 LAB — VIT B1 BLD-MCNC: 87 UG/L (ref 38–122)

## 2025-03-13 ENCOUNTER — TELEPHONE (OUTPATIENT)
Dept: NEUROLOGY | Facility: CLINIC | Age: 30
End: 2025-03-13
Payer: COMMERCIAL

## 2025-03-14 ENCOUNTER — PATIENT MESSAGE (OUTPATIENT)
Dept: NEUROLOGY | Facility: CLINIC | Age: 30
End: 2025-03-14

## 2025-03-14 ENCOUNTER — OFFICE VISIT (OUTPATIENT)
Dept: NEUROLOGY | Facility: CLINIC | Age: 30
End: 2025-03-14
Payer: COMMERCIAL

## 2025-03-14 DIAGNOSIS — G43.719 INTRACTABLE CHRONIC MIGRAINE WITHOUT AURA AND WITHOUT STATUS MIGRAINOSUS: Primary | ICD-10-CM

## 2025-03-14 DIAGNOSIS — G44.229 CHRONIC TENSION-TYPE HEADACHE, NOT INTRACTABLE: ICD-10-CM

## 2025-03-14 DIAGNOSIS — R29.898 NECK TIGHTNESS: ICD-10-CM

## 2025-03-14 DIAGNOSIS — R76.8 POSITIVE ANA (ANTINUCLEAR ANTIBODY): ICD-10-CM

## 2025-03-14 DIAGNOSIS — R11.0 NAUSEA: ICD-10-CM

## 2025-03-14 PROBLEM — Z98.891 S/P PRIMARY LOW TRANSVERSE C-SECTION: Status: ACTIVE | Noted: 2025-02-24

## 2025-03-14 RX ORDER — SUMATRIPTAN SUCCINATE 100 MG/1
100 TABLET ORAL DAILY PRN
Qty: 9 TABLET | Refills: 2 | Status: SHIPPED | OUTPATIENT
Start: 2025-03-14 | End: 2025-06-12

## 2025-03-14 RX ORDER — ERENUMAB-AOOE 70 MG/ML
70 INJECTION SUBCUTANEOUS
Qty: 1 ML | Refills: 2 | Status: SHIPPED | OUTPATIENT
Start: 2025-03-14 | End: 2025-06-12

## 2025-03-14 NOTE — PROGRESS NOTES
"Subjective:       Patient ID: Poornima Anyaa is a 30 y.o. female.      Chief Complaint: "Migraines"        HPI    HPI 30 y.o.  Years old female   with PMHx of  has a past medical history of Allergy, Elevated cortisol level, Migraines, Recurrent upper respiratory infection (URI), and Urticaria.  and other medical conditions came for the follow-up evaluation and recommendation of "Migraines".    The originating site (patient location) is: Home.      The distant site (neurologist location) is: Neurology Clinic at [facility].      The chief complaint leading to consultation is: "Migraines".      Visit type: Virtual visit with synchronous audio and video.      Consent: The patient verbally consented to participating in the video visit and informed that may decline to receive medical services by telemedicine and may withdraw from such care at any time.      I discussed with the patient the nature of our telemedicine visits, that:      I  would evaluate the patient and recommend diagnostics and treatments based on my assessment.    Our sessions are not being recorded and that personal health information is protected.    Our team would provide follow up care in person if/when the patient needs it.    Virtual (video/telemedicine) visits have significant limitations. A telemedicine exam is primarily focused on the history and what I can observe. Several critical parts of the neurological exam cannot be performed.       Interval History: (01/17/2025) - The potential side effects of Zofran, Inderal, and Imitrex during pregnancy were discussed in detail. The patient verbalized full understanding of the risks and benefits of these medications and expressed a desire to proceed with her current treatment plan.       Patient Summary:  The patient is a 34-week pregnant woman (due February 25), who has a long history of migraine headaches starting at the age of 16. She was previously established with Neurology care in [facility] [place] and would like to re-establish care with [facility] due to her recent relocation. The patient reports intermittent migraine attacks, with frequency of 1-2 times per week, and approximately 2 per month migraine-like. These headaches are characterized by throbbing pain, lasting between 2 hours to 2 days, and are often severe (rated 2-8/10). The pain is primarily located in the bilateral frontal and parietal areas, frequently affecting the left side. The migraines are associated with nausea, vomiting, light/sound sensitivity, and neck tightness, but no neurological deficits, visual auras, or significant other neurological symptoms are reported. Triggers include stress and lack of sleep, with no identifiable food triggers.    Pertinent Negative Symptoms: No associated neurological deficits, no scalp sensitivity,  neck pain with radiation, ear ringing, dizziness, balance issues, acute thunderclap onset, double or blurry vision, focal or bilateral limb weakness, or extremity numbness and tingling.    Alleviated: Dark and quiet room, cold towels to the forehead    Current Medications: OTC Tylenol / Imitrex / Inderal-LA / Zofran    Migraine History and Management:  The patient would like to resume prior treatment post-partum, particularly reintroducing AIMOVIG and other effective treatments.    Current Pregnancy:  Patient is 34 weeks gestation with an expected due date of February 25.  No specific pregnancy-related concerns regarding headaches or migraine management, though medications were adjusted in consultation with the OB-GYN.  The patient denies any significant issues with sleep, and there is no indication of sleep apnea or other related disorders.    Family History:  Migraines are common in the patients maternal side (mother, sister, and maternal grandmother).    Positional/Behavioral Factors: Headaches are not positional or postural, not worsened by movement and bending the head downward.  "Denies worsening with Valsalva maneuver.     Exclusions: No TMJ issues, head trauma, seizures, smoking history, caffeine overuse, vertigo, blackouts, fever, chills, or significant memory loss. No history of strokes or falls.          New Issues: (3/14/2025) -     Patient Report: No new issues were reported by the patient. The patient delivered a baby boy (Joseph) on  via breech , which was completed without complications. The patient is currently breastfeeding.    Medications:  Sumatriptan (Imitrex) 100 mg tablet: Take 1 tablet (100 mg total) by mouth daily as needed for migraine abortive therapy. The medication is effective after the second dosage, though the migraine recurs the following day. It is also effective for nausea associated with migraines. The patient is tolerating the medication well without side effects and reports taking it as prescribed.  Propranolol 20 mg PO Daily: Initially used as migraine prevention therapy, but the patient is no longer taking this medication and has no current migraine prevention regimen.  Zofran 4 mg Q8H PRN for nausea: No longer taking.    Headaches: The patient reports that headaches have significantly worsened since the last visit. The migraines are now described as severe and debilitating, affecting daily functions. She experiences migraine pain that wakes her up in the middle of the night. The location of the headaches is typically bilateral in the frontal and parietal areas, often affecting the left side. The patient denies headache auras.  Frequency: Increased, with tension-type headaches occurring 4-5 times per week and migraine-type headaches occurring 2 times per week.  Duration: Increased, Tension-type headaches last approximately 6 hours, while migraine-type headaches last 2 days, typically worse in the morning and evening.  Pain characteristics: The pain is described as "throbbing and dull" in nature with an intensity of 3-7/10.  Triggers: " Stress and lack of sleep exacerbate the headaches.  Alleviating factors: Sleep and Imitrex help alleviate the symptoms.  Associated symptoms: Nausea, vomiting, light/sound sensitivity, and neck tightness are present, but no neurological deficits, visual auras, or other significant neurological symptoms are reported.    Aimovig: The patient discussed starting Aimovig with her pediatrician while breastfeeding. She was informed that while the medication is considered to have a low risk, there is limited data on its safety during breastfeeding. Patient verbalized full understanding and requests to initiate therapy.     Vision: The patient reports no changes in vision. Her last eye clinic appointment was approximately 6 months ago, where normal intraocular pressures were noted, and there were no signs of papilledema or abnormalities.      Abortive therapies (tried and failed): Imitrex 100 mg PO PRN - effective - current     -Nurtec - effective     -Fioricet - not effective      Preventative therapies (tried and failed): None - Current     -Inderal-LA (Propranolol): The patient was previously prescribed Inderal-LA 60 mg PO daily. However, the frequency of migraines decreased during pregnancy, and the OB-GYN subsequently prescribed Inderal-LA 20-40 mg PO daily. The patient reports taking 20 mg PRN for migraines but self-discontinued the medication. She notes that it typically makes her drowsy and she usually experiences low blood pressure. The patient is not open to re-trying this medication at this time.    -Effexor - not effective     -Topamax - not effective     -AIMOVIG - effective, but patient stopped during pregnancy     -Anovy - effective     Pregnancy and birth control: None - patient is currently pregnant         Review of Systems   Constitutional:  Negative for activity change, appetite change, chills, diaphoresis, fatigue, fever and unexpected weight change.   HENT:  Negative for congestion, dental problem,  drooling, ear discharge, ear pain, facial swelling, hearing loss, mouth sores, nosebleeds, postnasal drip, rhinorrhea, sinus pressure, sinus pain, sneezing, sore throat, tinnitus, trouble swallowing and voice change.    Eyes:  Positive for photophobia. Negative for pain, discharge, redness, itching and visual disturbance.   Respiratory:  Negative for cough, chest tightness, shortness of breath and wheezing.    Cardiovascular:  Negative for chest pain, palpitations and leg swelling.   Gastrointestinal:  Positive for nausea. Negative for abdominal distention, abdominal pain, blood in stool, constipation, diarrhea and vomiting.   Endocrine: Negative for cold intolerance, heat intolerance, polydipsia, polyphagia and polyuria.   Genitourinary:  Negative for decreased urine volume, difficulty urinating, dysuria, flank pain, frequency, hematuria, pelvic pain, urgency and vaginal discharge.   Musculoskeletal:  Negative for arthralgias, back pain, gait problem, joint swelling, myalgias, neck pain and neck stiffness.        Neck Tightness    Skin:  Negative for color change and rash.   Allergic/Immunologic: Negative for immunocompromised state.   Neurological:  Positive for headaches. Negative for dizziness, tremors, seizures, syncope, facial asymmetry, speech difficulty, weakness, light-headedness and numbness.   Hematological:  Negative for adenopathy. Does not bruise/bleed easily.   Psychiatric/Behavioral:  Negative for agitation, behavioral problems, confusion, decreased concentration, dysphoric mood, hallucinations, self-injury, sleep disturbance and suicidal ideas. The patient is not nervous/anxious and is not hyperactive.    All other systems reviewed and are negative.              Current Outpatient Medications:     buPROPion (WELLBUTRIN XL) 150 MG TB24 tablet, TAKE 1 TABLET BY MOUTH EVERY DAY, Disp: 90 tablet, Rfl: 0    cetirizine (ZYRTEC) 10 mg Cap, , Disp: , Rfl:     cyanocobalamin (VITAMIN B-12) 250 MCG tablet,  Take 250 mcg by mouth once daily. ?dose, Disp: , Rfl:     fluticasone propionate (FLONASE) 50 mcg/actuation nasal spray, 2 sprays (100 mcg total) by Each Nostril route 2 (two) times daily., Disp: 31.6 mL, Rfl: 5    ondansetron (ZOFRAN) 4 MG tablet, Take 4 mg by mouth every 8 (eight) hours as needed., Disp: , Rfl:     propranoloL (INDERAL) 20 MG tablet, Take 20-40 mg by mouth., Disp: , Rfl:     sumatriptan (IMITREX) 100 MG tablet, Take 1 tablet (100 mg total) by mouth daily as needed for Migraine. (Take 1 tablet by mouth at onset of migraine, can repeat in 2 hours if needed. No more than 2 tabs per day or 3 days/wk, Disp: 9 tablet, Rfl: 0    Past Medical History:   Diagnosis Date    Allergy     Elevated cortisol level 12/29/2022    - she reports a history of a high cortisol level recommended repeat 8 a.m. cortisol which was normal    Migraines     Recurrent upper respiratory infection (URI)     Urticaria        Past Surgical History:   Procedure Laterality Date    ADENOIDECTOMY  12/2016    TONSILLECTOMY Bilateral     TYMPANOSTOMY TUBE PLACEMENT         Social History     Socioeconomic History    Marital status:     Number of children: 0   Tobacco Use    Smoking status: Never     Passive exposure: Never    Smokeless tobacco: Never   Substance and Sexual Activity    Alcohol use: Yes     Alcohol/week: 2.0 standard drinks of alcohol     Types: 2 Glasses of wine per week     Comment: weekend occasional    Drug use: Never    Sexual activity: Yes     Partners: Male     Birth control/protection: I.U.D.   Social History Narrative    . No children. 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 Grew up in the [facility]. Parents now live in [place], [place]. She moved back to [facility] 6/2022     Social Drivers of Health     Financial Resource Strain: Low Risk  (3/12/2024)    Overall Financial Resource Strain (CARDIA)     Difficulty of Paying Living Expenses: Not hard at all   Food Insecurity: No Food Insecurity (2/23/2025)     Received from [facility]    Hunger Vital Sign     Worried About Running Out of Food in the Last Year: Never true     Ran Out of Food in the Last Year: Never true   Transportation Needs: No Transportation Needs (2/23/2025)    Received from [facility]    PRAPARE - Transportation     Lack of Transportation (Medical): No     Lack of Transportation (Non-Medical): No   Physical Activity: Unknown (3/12/2024)    Exercise Vital Sign     Days of Exercise per Week: 2 days   Stress: No Stress Concern Present (3/12/2024)    [employer] - Occupational Stress Questionnaire     Feeling of Stress : Only a little   Housing Stability: Low Risk  (2/23/2025)    Received from [facility]    Housing Stability Vital Sign     Unable to Pay for Housing in the Last Year: No     Number of Times Moved in the Last Year: 0     Homeless in the Last Year: No         Past/Current Medical/Surgical History, Past/Current Social History, Past/Current Family History and Past/Current Medications were reviewed in detail.    Objective:           VITAL SIGNS WERE REVIEWED      GENERAL APPEARANCE:     The patient looks comfortable.    No signs of respiratory distress.    Normal breathing pattern.    No dysmorphic features    Normal eye contact.     GENERAL MEDICAL EXAM:    HEENT:  Head is atraumatic normocephalic.      Neck and Axillae: No JVD. No visible lesions.    Cardiopulmonary: No cyanosis. No tachypnea. Normal respiratory effort.    Gastrointestinal/Urogenital:  No jaundice. No stomas or lesions. No visible hernias. No catheters.     Skin, Hair and Nails: No pathognonomic skin rash. No neurofibromatosis. No visible lesions.No stigmata of autoimmune disease. No clubbing.    Limbs: No varicose veins. No visible swelling.    Muskoskeletal: No visible deformities.No visible lesions.             Neurological Exam  Mental Status  Awake, alert and oriented to person, place and time. Oriented to person, place and  time. Recent and remote memory are intact. Speech is normal. Language is fluent with no aphasia. Attention and concentration are normal. Fund of knowledge is appropriate for level of education. Apraxia absent.    Cranial Nerves  CN I: Sense of smell is normal.  CN II: Visual acuity is normal. Visual fields full to confrontation.  CN III, IV, VI: Extraocular movements intact bilaterally. Normal lids and orbits bilaterally.  CN V: Facial sensation is normal.  CN VII: Full and symmetric facial movement.  CN VIII: Hearing is normal.  CN IX, X: Palate elevates symmetrically  CN XI: Shoulder shrug strength is normal.  CN XII: Tongue midline without atrophy or fasciculations.    Motor  Normal muscle bulk throughout. No fasciculations present. Normal muscle tone. No abnormal involuntary movements.    Sensory  Light touch is normal in upper and lower extremities. Temperature is normal in upper and lower extremities.     Coordination    Finger-to-nose, rapid alternating movements and heel-to-shin normal bilaterally without dysmetria.    Gait  Normal casual, toe, heel and tandem gait.        Lab Results   Component Value Date    WBC 6.05 11/09/2022    HGB 14.1 11/09/2022    HCT 42.7 11/09/2022    MCV 93 11/09/2022     11/09/2022       Sodium   Date Value Ref Range Status   01/17/2025 136 136 - 145 mmol/L Final     Potassium   Date Value Ref Range Status   01/17/2025 4.1 3.5 - 5.1 mmol/L Final     Chloride   Date Value Ref Range Status   01/17/2025 106 95 - 110 mmol/L Final     CO2   Date Value Ref Range Status   01/17/2025 18 (L) 23 - 29 mmol/L Final     Glucose   Date Value Ref Range Status   01/17/2025 75 70 - 110 mg/dL Final     BUN   Date Value Ref Range Status   01/17/2025 6 6 - 20 mg/dL Final     Creatinine   Date Value Ref Range Status   01/17/2025 0.7 0.5 - 1.4 mg/dL Final     Calcium   Date Value Ref Range Status   01/17/2025 9.0 8.7 - 10.5 mg/dL Final     Total Protein   Date Value Ref Range Status  "  01/17/2025 6.6 6.0 - 8.4 g/dL Final     Albumin   Date Value Ref Range Status   01/17/2025 2.9 (L) 3.5 - 5.2 g/dL Final     Total Bilirubin   Date Value Ref Range Status   01/17/2025 0.3 0.1 - 1.0 mg/dL Final     Comment:     For infants and newborns, interpretation of results should be based  on gestational age, weight and in agreement with clinical  observations.    Premature Infant recommended reference ranges:  Up to 24 hours.............<8.0 mg/dL  Up to 48 hours............<12.0 mg/dL  3-5 days..................<15.0 mg/dL  6-29 days.................<15.0 mg/dL       Alkaline Phosphatase   Date Value Ref Range Status   01/17/2025 120 40 - 150 U/L Final     AST   Date Value Ref Range Status   01/17/2025 20 10 - 40 U/L Final     ALT   Date Value Ref Range Status   01/17/2025 16 10 - 44 U/L Final     Anion Gap   Date Value Ref Range Status   01/17/2025 12 8 - 16 mmol/L Final       Lab Results   Component Value Date    [id number] 552 01/17/2025       Lab Results   Component Value Date    TSH 1.101 11/09/2022       No results found in the last 24 hours.    No results found in the last 24 hours.    Reviewed the neuroimaging independently       Assessment:   30 y.o. Years old female  with PMH as above came for an evaluation of "Migraines".    1. Chronic migraine without aura without status migrainosus, not intractable        2. Chronic tension-type headache, not intractable        3. Nausea        4. Neck tightness        5. Positive MICHAEL (antinuclear antibody)           Plan:   Patient Neurological Assessment is non-focal via virtual exam.      HEADACHE PLAN    Aimovig and Imitrex: The patient and I discussed the potential initiation of Aimovig and Imitrex for migraine management while breastfeeding due to her report of worsening Migraines. It was explained that AIMOVIG and Imitrex are not recommended during pregnancy or breastfeeding due to the lack of data on fetal effects and limited data on evidence-based " guidelines. I informed the patient that there is limited data available regarding their safety in this context. We discussed the risks and benefits of using these medications while breastfeeding, including the potential for medication transfer into breast milk and the associated risks for the infant. The patient was provided with evidence-based information regarding the presence of these medications in breast milk and potential complications.  The patient verbalized full understanding of these risks and benefits and has decided to proceed with therapy. I also instructed the patient to pump and discard breast milk for 8-12 hours following the administration of Imitrex, in accordance with evidence-based guidelines, to reduce the risk of exposure to the infant. The patient acknowledged and understood these instructions and wishes to proceed with treatment.    Aimovig: The patient requests to resume Aimovig for migraine management. The patient discussed starting Aimovig with her pediatrician while breastfeeding and was informed that, while the medication is considered to have a low risk, there is limited data on its safety during breastfeeding. The patient verbalized full understanding of the risks and benefits associated with the medication and has expressed a desire to initiate therapy.    -Patient instructed to keep a headache diary for review at next follow-up visit.     -Order Brain MRI WO Contrast to rule out structural abnormalities contributing to Headaches.Patient is now post-partum. Will order HCG blood test to rule out pregnancy - no current contraception per patient. We will schedule follow up appointment after MRI brain has been completed.     -Continue PT for Headache Prevention Therapy / Neck therapy. Patient reports she is seeing someone outside of [facility] and it is helping.     -Continue routine ophthalmology evaluation.     -All medication indications and potential side effects were discussed in detail  with the patient. Informational pamphlets were provided for further reference. The patient verbally confirmed full understanding of the medications and their instructions.    -The patient was advised that only Tylenol (OTC) is considered safe for use during pregnancy and breast feeding.     -Continue sumatriptan (IMITREX) 100 MG tablet; Take 1 tablet (100 mg total) by mouth daily as needed for Migraine Abortive therapy. Patient requests to continue treatment knowing potential risks during breastfeeding. I also instructed the patient to pump and discard breast milk for 8-12 hours following the administration of Imitrex, in accordance with evidence-based guidelines, to reduce the risk of exposure to the infant. The patient acknowledged and understood these instructions and wishes to proceed with treatment.    -Discontinue Propanolol 20 mg PO Daily - Patient no longer taking / side effects of hypotension and fatigue    -Discontinue Zofran 4 mg Q8H PRN Nausea as prescribed. Not needed per patient.     1. Intractable chronic migraine without aura and without status migrainosus  - erenumab-aooe (AIMOVIG AUTOINJECTOR) 70 mg/mL autoinjector; Inject 1 mL (70 mg total) into the skin every 30 days.  Dispense: 1 mL; Refill: 2  - sumatriptan (IMITREX) 100 MG tablet; Take 1 tablet (100 mg total) by mouth daily as needed for Migraine. (Take 1 tablet by mouth at onset of migraine, can repeat in 2 hours if needed. No more than 2 tabs per day or 3 days/wk. Withholding breastfeeding for 8 to 12 hours after the maternal dose will minimize infant exposure via breast milk  Dispense: 9 tablet; Refill: 2  - MRI Brain Without Contrast; Future  - HCG, QUANTITATIVE, PREGNANCY; Future    2. Chronic tension-type headache, not intractable    3. Nausea    4. Neck tightness    5. Positive MICHAEL (antinuclear antibody)           LABORATORY EVALUATION    Labs: ([id number]) CRP / Sed Rate / CMP / B1 / B12 / Folate / Homocysteine / Strep Gp B DNA Probe /  BV / Glucose / Urine Drug Screen / TSH / Varicella-Zoster Virus Ab, IgG / CBC / Rubella IgG Quant / RPR / Hep B / HIV /   -personally reviewed -non-significant abnormalities except    MICHAEL Screen: The MICHAEL screen was positive and abnormal. A referral to Rheumatology was ordered, but the patient declined further workup at this time. The patient reports that a prior Rheumatology workup was negative.      RADIOLOGY EVALUATION     Personally Reviewed X-Ray Cervical Spine  - done 01/2024 - no acute abnormalities / FINDINGS:  No acute fractures.  Unremarkable predental space and no widening of prevertebral soft tissues.  Reversal of normal cervical lordosis on the neutral lateral image.  No definite findings of anterior or retrolisthesis on the neutral lateral view.  On flexion and extension views, no findings of instability.  Preserved cervical disc spaces.  Preserved uncovertebral and facet articulations.  Oblique views demonstrate preserved right and left foramen.  Intact odontoid tip and unremarkable C1-C2 articulation.  Based on the AP image, very minimal lower cervical and upper thoracic levocurvature and partially visualized the upper thoracic dextrocurvature..      NEUROPHYSIOLOGY EVALUATION       PATHOLOGY EVALUATION        NEUROCOGNITIVE AND NEUROPSYCHOLOGY EVALUATION                  COMMON MIGRAINE WITHOUT AURA, EPISODIC, MENSTRUAL WITH EPISODIC TENSION HEADACHE       HEADACHE DIARY         DISCUSSED THE THREE-FOLD MANAGEMENT OF MIGRAINE:      LIFESTYLE CHANGES:       Good sleep hygiene  Avoid triggers  Minimize physical and emotional stress  Good hydration   Small frequent meals and avoid skipping meals   Moderate aerobic exercises.         ABORTIVE MEDICATIONS (ACUTE-RESCUE MEDICATIONS):       PRN medications are not safe.    First line is Acetaminophen (1000 mg orally) because it has the best maternal-fetal safety profile.     Acetaminophen 650 to 1000 mg and Metoclopramide / Reglan 10 mg.     -Discussed  Metoclopramide / Reglan side effects including CNS Depression, Drowsiness, fatigue, lassitude, dizziness, confusion, dystonic reactions (such as involuntary limb movements, Akathisia (motor restlessness), manifesting as feelings of anxiety, agitation, insomnia, inability to sit still, pacing, and foot tapping, Parkinsonism symptoms, Tardive dyskinesia), Galactorrhea not associated with childbirth, amenorrhea, and gynecomastia. Patient verbalized full understanding.       PREVENTATIVE (MORE ACCURATELY MIGRAINE REDUCTION) MEDICATIONS:       Traditional preventative options are not safe including Botox.        PREGNANCY AND MIGRAINE           We had a lengthy discussion about managing migraine during pregnancy.      PRN medications are not safe. The safest option is Reglan PRN and not to be taken more than 2-3 times/week. Other options:  Fioricet PRN  and Steroids PRN as well. Fioricet should be limited to only four to five days per month.     Traditional preventative options are not safe including Botox. Neuromodulation like  Cefaly and Relivion are safe during pregnancy.        MEDICAL/SURGICAL COMORBIDITIES     All relevant medical comorbidities noted and managed by primary care physician and medical care team.          HEALTHY LIFESTYLE AND PREVENTATIVE CARE    The patient to adhere to the age-appropriate health maintenance guidelines including screening tests and vaccinations. The patient to adhere to  healthy lifestyle, optimal weight, exercise, healthy diet, good sleep hygiene and avoiding drugs including smoking, alcohol and recreational drugs.    I spent a total of 63 minutes on the day of the visit.This includes face to face time and non-face to face time preparing to see the patient (eg, review of tests), obtaining and/or reviewing separately obtained history, documenting clinical information in the electronic or other health record, independently interpreting results and communicating results to the  patient/family/caregiver, or care coordinator.     Please do not hesitate to contact me with any updates, questions or concerns.    No follow-ups on file.    Jorge Diaz, MSN, FNP-C    General Neurology

## 2025-04-15 ENCOUNTER — PATIENT MESSAGE (OUTPATIENT)
Dept: NEUROLOGY | Facility: CLINIC | Age: 30
End: 2025-04-15
Payer: COMMERCIAL

## 2025-06-18 DIAGNOSIS — G43.719 INTRACTABLE CHRONIC MIGRAINE WITHOUT AURA AND WITHOUT STATUS MIGRAINOSUS: Primary | ICD-10-CM

## 2025-06-19 RX ORDER — SUMATRIPTAN SUCCINATE 100 MG/1
100 TABLET ORAL DAILY PRN
Qty: 9 TABLET | Refills: 2 | OUTPATIENT
Start: 2025-06-19 | End: 2025-09-17

## 2025-06-19 RX ORDER — ERENUMAB-AOOE 70 MG/ML
70 INJECTION SUBCUTANEOUS
Qty: 1 ML | Refills: 2 | Status: SHIPPED | OUTPATIENT
Start: 2025-06-19 | End: 2025-09-17

## 2025-06-19 RX ORDER — ERENUMAB-AOOE 70 MG/ML
70 INJECTION SUBCUTANEOUS
Qty: 1 ML | Refills: 2 | OUTPATIENT
Start: 2025-06-19 | End: 2025-09-17

## 2025-06-19 RX ORDER — SUMATRIPTAN SUCCINATE 100 MG/1
100 TABLET ORAL DAILY PRN
Qty: 9 TABLET | Refills: 2 | Status: SHIPPED | OUTPATIENT
Start: 2025-06-19 | End: 2025-09-17